# Patient Record
Sex: FEMALE | Race: ASIAN | Employment: FULL TIME | URBAN - METROPOLITAN AREA
[De-identification: names, ages, dates, MRNs, and addresses within clinical notes are randomized per-mention and may not be internally consistent; named-entity substitution may affect disease eponyms.]

---

## 2020-10-12 ENCOUNTER — HOSPITAL ENCOUNTER (OUTPATIENT)
Age: 45
Discharge: HOME | End: 2020-10-12
Payer: COMMERCIAL

## 2020-10-12 VITALS
OXYGEN SATURATION: 99 % | DIASTOLIC BLOOD PRESSURE: 108 MMHG | HEART RATE: 82 BPM | TEMPERATURE: 98 F | RESPIRATION RATE: 16 BRPM | SYSTOLIC BLOOD PRESSURE: 174 MMHG

## 2020-10-12 DIAGNOSIS — G56.03 BILATERAL CARPAL TUNNEL SYNDROME: Primary | ICD-10-CM

## 2020-10-12 PROCEDURE — 99203 OFFICE O/P NEW LOW 30 MIN: CPT | Performed by: PHYSICIAN ASSISTANT

## 2020-10-12 NOTE — ED INITIAL ASSESSMENT (HPI)
Bilateral wrist- numbness and tingling  X 3 months. Per pt its getting worse L>R. Wrist pain at night  X 6 weeks on and off. Denies any injury. Sometimes pain meds helps.

## 2020-10-12 NOTE — ED PROVIDER NOTES
Patient Seen in: [facility] [place]      History   Patient presents with:  Numbness Weakness:  both wrist  Wrist Pain    Stated Complaint: wrist discomfort,numb     HPI    Patient is a 77-year-old female who presents today for evaluatio wrists are normal in appearance. No obvious deformity or edema. Capillary refill is <2sec. Negative Tinel's, negative [place].     Neurological: CN III - XII grossly intact, full strength and sensation in BL extremities        ED Course   Labs Reviewed -

## 2020-10-20 ENCOUNTER — OFFICE VISIT (OUTPATIENT)
Dept: FAMILY MEDICINE CLINIC | Facility: CLINIC | Age: 45
End: 2020-10-20
Payer: COMMERCIAL

## 2020-10-20 VITALS
SYSTOLIC BLOOD PRESSURE: 220 MMHG | HEIGHT: 61.5 IN | WEIGHT: 166 LBS | BODY MASS INDEX: 30.94 KG/M2 | TEMPERATURE: 98 F | OXYGEN SATURATION: 99 % | HEART RATE: 78 BPM | RESPIRATION RATE: 17 BRPM | DIASTOLIC BLOOD PRESSURE: 110 MMHG

## 2020-10-20 DIAGNOSIS — R20.2 PARESTHESIAS: ICD-10-CM

## 2020-10-20 DIAGNOSIS — Z13.0 SCREENING FOR ENDOCRINE, NUTRITIONAL, METABOLIC AND IMMUNITY DISORDER: ICD-10-CM

## 2020-10-20 DIAGNOSIS — Z13.228 SCREENING FOR ENDOCRINE, NUTRITIONAL, METABOLIC AND IMMUNITY DISORDER: ICD-10-CM

## 2020-10-20 DIAGNOSIS — Z13.21 SCREENING FOR ENDOCRINE, NUTRITIONAL, METABOLIC AND IMMUNITY DISORDER: ICD-10-CM

## 2020-10-20 DIAGNOSIS — I10 HYPERTENSION, UNCONTROLLED: Primary | ICD-10-CM

## 2020-10-20 DIAGNOSIS — Z13.29 SCREENING FOR ENDOCRINE, NUTRITIONAL, METABOLIC AND IMMUNITY DISORDER: ICD-10-CM

## 2020-10-20 PROCEDURE — 93000 ELECTROCARDIOGRAM COMPLETE: CPT | Performed by: EMERGENCY MEDICINE

## 2020-10-20 PROCEDURE — 3080F DIAST BP >= 90 MM HG: CPT | Performed by: EMERGENCY MEDICINE

## 2020-10-20 PROCEDURE — 3077F SYST BP >= 140 MM HG: CPT | Performed by: EMERGENCY MEDICINE

## 2020-10-20 PROCEDURE — 3008F BODY MASS INDEX DOCD: CPT | Performed by: EMERGENCY MEDICINE

## 2020-10-20 PROCEDURE — 99204 OFFICE O/P NEW MOD 45 MIN: CPT | Performed by: EMERGENCY MEDICINE

## 2020-10-20 RX ORDER — IBUPROFEN 800 MG/1
TABLET ORAL
COMMUNITY
Start: 2020-10-17

## 2020-10-20 RX ORDER — LISINOPRIL AND HYDROCHLOROTHIAZIDE 25; 20 MG/1; MG/1
1 TABLET ORAL DAILY
Qty: 90 TABLET | Refills: 0 | Status: SHIPPED | OUTPATIENT
Start: 2020-10-20 | End: 2020-11-10

## 2020-10-20 NOTE — PATIENT INSTRUCTIONS
Thank you for choosing [facility]  To Do:  FOR JENY BURCIAGA        1. Start Lisinopril-Hydrochlorothizide, once a day  2. Have blood tests done  3. Follow up in 2 weeks  for recheck and physical and PAP, and mammogram  4. EKG done today  5. relish  · Bottled salad dressings    For snacks and desserts  · Yogurt  · Unsalted, air-popped popcorn  · Unsalted nuts or seeds  Stay away from  · Pies and cakes  · Packaged dessert mixes  · Pizza  · Canned and packaged puddings  · Pretzels, chips, cracke heart-healthy lifestyle:    Choose heart-healthy foods  · Select low-salt, low-fat foods. Limit sodium intake to 2,400 mg per day or the amount suggested by your healthcare provider. · Limit canned, dried, cured, packaged, and fast foods.  These can contai your healthcare provider about quitting smoking. Smoking significantly increases your risk for heart disease and stroke. Ask your healthcare provider about community smoking cessation programs and other options.     Medicines  If lifestyle changes aren’t en tested over a period of time.    Blood pressure is categorized as normal, elevated, or stage 1 or stage 2 high blood pressure:   · Normal blood pressure is systolic of less than 867 and diastolic of less than 80 (120/80)  · Elevated blood pressure is systol about the product's potential interaction with your high blood pressure and your medicines. · Stimulants such as amphetamine or cocaine could be lethal for someone with high blood pressure. Never take these. · Limit how much caffeine you drink.  Consider upper arm at heart level. Make certain the middle of the cuff is directly above the bend of the elbow. Check the monitor's instruction manual for an illustration. · Take multiple readings.  When you measure, take 2 or 3 readings one minute apart and record Corinne 4037. [address] [facility] [place], [address] [place] [address]. All rights reserved. This information is not intended as a substitute for professional medical care. Always follow your healthcare professional's instructions.         Discharge Instructions take medicines that contain heart stimulants, including over-the-counter medicines. Check for warnings about high blood pressure on the label.  Ask the pharmacist before purchasing something you haven't used before  · Check with your doctor or pharmacist be pressure. · Control your stress. Learn ways to manage stress. · Limit alcohol to no more than 1 drink a day for women and 2 drinks a day for men. Follow-up care  Make a follow-up appointment as directed.   When to call your healthcare provider  Call yo

## 2020-10-20 NOTE — PROGRESS NOTES
Chief Complaint:   Patient presents with:  Blood Pressure: NP, high blood pressure   Urgent Care F/u: F/u wrist pain and numbness     HPI:   This is a 40year old female     BLOOD PRESSURE    Elevated BP noted at urgent care.   Home BP readings also flavio Height as of this encounter: 61.5\". Weight as of this encounter: 166 lb (75.3 kg). Vital signs reviewed. Appears stated age, well groomed.   GENERAL: well developed, well nourished, well hydrated, no distress  SKIN: good skin turgor, no obvious rashes mammogram  4. EKG done today  5. Continue with wrist splinting at night  6. Ok to continue with ibuprofen as needed  7. Arrange for nerve conduction studies  8.  Go to emergency room if there is any worsening symptoms including chest pain shortness of breat

## 2020-11-02 ENCOUNTER — PROCEDURE VISIT (OUTPATIENT)
Dept: NEUROLOGY | Facility: CLINIC | Age: 45
End: 2020-11-02
Payer: COMMERCIAL

## 2020-11-02 DIAGNOSIS — G56.03 BILATERAL CARPAL TUNNEL SYNDROME: Primary | ICD-10-CM

## 2020-11-02 PROCEDURE — 95910 NRV CNDJ TEST 7-8 STUDIES: CPT | Performed by: OTHER

## 2020-11-02 PROCEDURE — 95886 MUSC TEST DONE W/N TEST COMP: CPT | Performed by: OTHER

## 2020-11-02 NOTE — PROCEDURES
Date of service: 11/2/2020    Procedure: Nerve Conduction Study and Electromyography - complete EMG study in bilateral upper extremities. History: Electrodiagnostic testing on this 40year old female was performed in bilateral upper extremities.  The pat RAD, BICEPS, TRICEPS, DELTOID reveals normal insertional activity, normal spontaneous activity. Motor unit potentials (MUPs) are of normal amplitude and duration with a full recruitment pattern.  No fibrillations or positive sharp waves (PSWs) or fasciculat

## 2020-11-10 ENCOUNTER — OFFICE VISIT (OUTPATIENT)
Dept: FAMILY MEDICINE CLINIC | Facility: CLINIC | Age: 45
End: 2020-11-10
Payer: COMMERCIAL

## 2020-11-10 VITALS
WEIGHT: 160 LBS | TEMPERATURE: 98 F | HEART RATE: 59 BPM | SYSTOLIC BLOOD PRESSURE: 139 MMHG | BODY MASS INDEX: 29.82 KG/M2 | DIASTOLIC BLOOD PRESSURE: 85 MMHG | RESPIRATION RATE: 15 BRPM | OXYGEN SATURATION: 97 % | HEIGHT: 61.5 IN

## 2020-11-10 DIAGNOSIS — Z23 NEED FOR VACCINATION: ICD-10-CM

## 2020-11-10 DIAGNOSIS — Z12.31 ENCOUNTER FOR SCREENING MAMMOGRAM FOR BREAST CANCER: ICD-10-CM

## 2020-11-10 DIAGNOSIS — Z12.4 SCREENING FOR CERVICAL CANCER: ICD-10-CM

## 2020-11-10 DIAGNOSIS — Z00.00 ENCOUNTER FOR ANNUAL PHYSICAL EXAM: Primary | ICD-10-CM

## 2020-11-10 DIAGNOSIS — I10 HYPERTENSION, UNCONTROLLED: ICD-10-CM

## 2020-11-10 DIAGNOSIS — E66.3 OVERWEIGHT (BMI 25.0-29.9): ICD-10-CM

## 2020-11-10 PROBLEM — G56.03 BILATERAL CARPAL TUNNEL SYNDROME: Status: ACTIVE | Noted: 2020-11-10

## 2020-11-10 PROCEDURE — 3079F DIAST BP 80-89 MM HG: CPT | Performed by: EMERGENCY MEDICINE

## 2020-11-10 PROCEDURE — 3075F SYST BP GE 130 - 139MM HG: CPT | Performed by: EMERGENCY MEDICINE

## 2020-11-10 PROCEDURE — 90686 IIV4 VACC NO PRSV 0.5 ML IM: CPT | Performed by: EMERGENCY MEDICINE

## 2020-11-10 PROCEDURE — 99396 PREV VISIT EST AGE 40-64: CPT | Performed by: EMERGENCY MEDICINE

## 2020-11-10 PROCEDURE — 88175 CYTOPATH C/V AUTO FLUID REDO: CPT | Performed by: EMERGENCY MEDICINE

## 2020-11-10 PROCEDURE — 3008F BODY MASS INDEX DOCD: CPT | Performed by: EMERGENCY MEDICINE

## 2020-11-10 PROCEDURE — 87624 HPV HI-RISK TYP POOLED RSLT: CPT | Performed by: EMERGENCY MEDICINE

## 2020-11-10 PROCEDURE — 90471 IMMUNIZATION ADMIN: CPT | Performed by: EMERGENCY MEDICINE

## 2020-11-10 RX ORDER — LISINOPRIL AND HYDROCHLOROTHIAZIDE 25; 20 MG/1; MG/1
1 TABLET ORAL DAILY
Qty: 90 TABLET | Refills: 0 | Status: SHIPPED | OUTPATIENT
Start: 2020-11-10 | End: 2021-02-10

## 2020-11-10 NOTE — PROGRESS NOTES
Teagan Balbuena is a 39year old female who presents for a complete physical exam.   HPI:     Patient presents with:  Physical: Annual physical and pap   Blood Pressure: F/u         Age: 39    1First day of last menstrual period (or first year of YES       d. If over 27years old, have you  had your cholesterol level checked  in the past five years? NO       e. Have you had a tetanus shot  the past 10 years? YES 2018       f. Does your house have a working smoke detector?  YES        g. Do you have Tobacco Use      Smoking status: Never Smoker      Smokeless tobacco: Never Used    Alcohol use: Yes      Frequency: Monthly or less    Drug use: Never           REVIEW OF SYSTEMS:   GENERAL HEALTH: feels well, no fatigue.   SKIN: denies any unusual skin le D/C. Bimanual exam shows no CMT or adenexal masses or tenderness. Neuro: Cranial nerves II-XII normal,no focal abnormalities, and reflexes coordination and gait normal and symmetric. Sensation intact.   Extremities: are symmetric with no cyanosis, clubbing, every 7-10 years. Call or come in if there are concerns regarding domestic abuse, sexually transmitted diseases, alcohol/drug addiction, depression/anxiety issues, or any further concerns. PATIENT INSTRUCTIONS:      1. Arrange for mammogram  2.  Have bl

## 2020-11-10 NOTE — PATIENT INSTRUCTIONS
Thank you for choosing [facility]  To Do:  FOR JENY BURCIAGA        1. Arrange for mammogram  2. Have blood tests done  3. Follow up in 3 months  4. Continue with weight loss  5. Low salt diet  6. MOnitor BP  7.  Continue with Lisinopril/Hydro muffin, whole wheat   175 mg/1 muffin   Cheddar cheese   205 mg/1 oz.   Navy beans, cooked   79 mg/1/2 cup   Kale   90 mg/1/2 cup   Ice cream strawberry   79 mg/1/2 cup           Orange, navel   56 mg/1 medium   Note: Calcium levels may vary depending on b who have had a complete hysterectomy Pap test every 3 years or Pap test plus human papilloma virus (HPV) test every 5 years   Chlamydia Women at increased risk for infection At routine exams if you're at risk or have symptoms   Depression All women in this women in this age group who have no record of these infections or vaccines 1 or 2 doses   Meningococcal Women at increased risk for infection – talk with your healthcare provider 1 or more doses   Pneumococcal conjugate vaccine (PCV13) and pneumococcal hayde

## 2020-11-17 ENCOUNTER — HOSPITAL ENCOUNTER (OUTPATIENT)
Dept: MAMMOGRAPHY | Age: 45
Discharge: HOME | End: 2020-11-17
Attending: EMERGENCY MEDICINE
Payer: COMMERCIAL

## 2020-11-17 ENCOUNTER — LABORATORY ENCOUNTER (OUTPATIENT)
Dept: LAB | Age: 45
End: 2020-11-17
Attending: EMERGENCY MEDICINE
Payer: COMMERCIAL

## 2020-11-17 DIAGNOSIS — Z12.31 ENCOUNTER FOR SCREENING MAMMOGRAM FOR BREAST CANCER: ICD-10-CM

## 2020-11-17 DIAGNOSIS — I10 HYPERTENSION, UNCONTROLLED: ICD-10-CM

## 2020-11-17 DIAGNOSIS — Z13.0 SCREENING FOR ENDOCRINE, NUTRITIONAL, METABOLIC AND IMMUNITY DISORDER: ICD-10-CM

## 2020-11-17 DIAGNOSIS — Z13.228 SCREENING FOR ENDOCRINE, NUTRITIONAL, METABOLIC AND IMMUNITY DISORDER: ICD-10-CM

## 2020-11-17 DIAGNOSIS — Z13.21 SCREENING FOR ENDOCRINE, NUTRITIONAL, METABOLIC AND IMMUNITY DISORDER: ICD-10-CM

## 2020-11-17 DIAGNOSIS — Z13.29 SCREENING FOR ENDOCRINE, NUTRITIONAL, METABOLIC AND IMMUNITY DISORDER: ICD-10-CM

## 2020-11-17 DIAGNOSIS — E66.3 OVERWEIGHT (BMI 25.0-29.9): ICD-10-CM

## 2020-11-17 PROCEDURE — 36415 COLL VENOUS BLD VENIPUNCTURE: CPT

## 2020-11-17 PROCEDURE — 83036 HEMOGLOBIN GLYCOSYLATED A1C: CPT

## 2020-11-17 PROCEDURE — 80061 LIPID PANEL: CPT

## 2020-11-17 PROCEDURE — 81001 URINALYSIS AUTO W/SCOPE: CPT

## 2020-11-17 PROCEDURE — 80053 COMPREHEN METABOLIC PANEL: CPT

## 2020-11-17 PROCEDURE — 84443 ASSAY THYROID STIM HORMONE: CPT

## 2020-11-17 PROCEDURE — 77067 SCR MAMMO BI INCL CAD: CPT | Performed by: EMERGENCY MEDICINE

## 2020-11-17 PROCEDURE — 85025 COMPLETE CBC W/AUTO DIFF WBC: CPT

## 2020-11-19 ENCOUNTER — TELEPHONE (OUTPATIENT)
Dept: FAMILY MEDICINE CLINIC | Facility: CLINIC | Age: 45
End: 2020-11-19

## 2020-11-19 NOTE — TELEPHONE ENCOUNTER
----- Message from Иван Morocho MD sent at 11/19/2020  2:04 PM CST -----  HBA1C in prediabetic range. Pls stress importance of weight loss in prevention of progression to DM. Pls  on losing weight.  Low carb diet    Rest of labs stable

## 2020-11-19 NOTE — TELEPHONE ENCOUNTER
----- Message from Jose C Tran MD sent at 11/19/2020  1:56 PM CST -----  NEG PAP and  Neg HPV  Repeat in 3 years verbal instruction

## 2021-02-09 DIAGNOSIS — I10 HYPERTENSION, UNCONTROLLED: ICD-10-CM

## 2021-02-10 RX ORDER — LISINOPRIL AND HYDROCHLOROTHIAZIDE 25; 20 MG/1; MG/1
1 TABLET ORAL DAILY
Qty: 90 TABLET | Refills: 0 | Status: SHIPPED | OUTPATIENT
Start: 2021-02-10 | End: 2021-05-20

## 2021-02-10 NOTE — TELEPHONE ENCOUNTER
Medication(s) to Refill:   Requested Prescriptions     Pending Prescriptions Disp Refills   • LISINOPRIL-HYDROCHLOROTHIAZIDE 20-25 MG Oral Tab [Pharmacy Med Name: LISINOPRIL-HCTZ 20/25MG TABLETS] 90 tablet 0     Sig: TAKE 1 TABLET BY MOUTH DAILY

## 2021-02-16 ENCOUNTER — OFFICE VISIT (OUTPATIENT)
Dept: FAMILY MEDICINE CLINIC | Facility: CLINIC | Age: 46
End: 2021-02-16
Payer: COMMERCIAL

## 2021-02-16 VITALS
SYSTOLIC BLOOD PRESSURE: 138 MMHG | DIASTOLIC BLOOD PRESSURE: 84 MMHG | OXYGEN SATURATION: 100 % | HEART RATE: 104 BPM | BODY MASS INDEX: 32 KG/M2 | WEIGHT: 171 LBS | TEMPERATURE: 98 F | RESPIRATION RATE: 17 BRPM

## 2021-02-16 DIAGNOSIS — I10 HYPERTENSION, UNCONTROLLED: Primary | ICD-10-CM

## 2021-02-16 DIAGNOSIS — R06.83 SNORING: ICD-10-CM

## 2021-02-16 PROCEDURE — 3079F DIAST BP 80-89 MM HG: CPT | Performed by: EMERGENCY MEDICINE

## 2021-02-16 PROCEDURE — 99213 OFFICE O/P EST LOW 20 MIN: CPT | Performed by: EMERGENCY MEDICINE

## 2021-02-16 PROCEDURE — 3075F SYST BP GE 130 - 139MM HG: CPT | Performed by: EMERGENCY MEDICINE

## 2021-02-16 NOTE — PROGRESS NOTES
Chief Complaint:   Patient presents with:  Blood Pressure: F/u    HPI:   This is a 39year old female       HYPERTENSION  On lisinopril-hydrochlorothiazide,   Compliant with meds  No cough with medications  Eating better.        SNORING  Admits to loud sn obvious rashes  HEENT: atraumatic, normocephalic, ears, nose and throat are clear, Mallampati class III  EYES: sclera non icteric bilateral  NECK: supple, no adenopathy, no thyromegaly  LUNGS: clear to auscultation, no RRW  CARDIO: RRR without murmur  EXTR

## 2021-02-16 NOTE — PATIENT INSTRUCTIONS
Thank you for choosing [facility]  To Do:  FOR JENY BURCIAGA        1. Follow up in 6 months   2. continue home BP monitoring 2-3 x a week  3. Continue with lisinopril-hydrochlorothiazide  4.  Monitor sleeping and snoring habits           Low- [address]. All rights reserved. This information is not intended as a substitute for professional medical care. Always follow your healthcare professional's instructions.         Discharge Instructions for High Blood Pressure (Hypertension)  You have bee including over-the-counter medicines. Check for warnings about high blood pressure on the label.  Ask the pharmacist before purchasing something you haven't used before  · Check with your doctor or pharmacist before taking a decongestant such as pseudoephed stress. · Limit alcohol to no more than 1 drink a day for women and 2 drinks a day for men. Follow-up care  Make a follow-up appointment as directed.   When to call your healthcare provider  Call your healthcare provider right away if you have any of th scar tissue as it heals. This makes the arteries stiff and weak. Plaque sticks to the scarred tissue narrowing and hardening the arteries. High blood pressure also causes your heart to work harder to get blood out to the body.  High blood pressure raises yo blood pressure goals should be.   Controlling blood pressure  If your blood pressure is too high, work with your doctor on a plan for lowering it. Below are steps you can take that will help lower your blood pressure. · Choose heart-healthy foods.  Eating The only way to know for sure is to check your pressure regularly. · Medicine is only one part of controlling high blood pressure. You also need to manage your weight, get regular exercise, and adjust your eating habits.   · High blood pressure is usually risk. Check the ones that apply to you. ? What you eat  Do you eat a lot of salty, fatty, fried, or greasy foods? Do you go to restaurants or eat out frequently? ?  What you drink  Do you have more than 1 alcoholic drink a day if you are a female or more

## 2021-05-19 DIAGNOSIS — I10 HYPERTENSION, UNCONTROLLED: ICD-10-CM

## 2021-05-19 NOTE — TELEPHONE ENCOUNTER
Medication(s) to Refill:   Requested Prescriptions     Pending Prescriptions Disp Refills   • LISINOPRIL-HYDROCHLOROTHIAZIDE 20-25 MG Oral Tab [Pharmacy Med Name: LISINOPRIL-HCTZ 20/25MG TABLETS] 90 tablet 0     Sig: TAKE 1 TABLET BY MOUTH DAILY         Re

## 2021-05-20 RX ORDER — LISINOPRIL AND HYDROCHLOROTHIAZIDE 25; 20 MG/1; MG/1
1 TABLET ORAL DAILY
Qty: 90 TABLET | Refills: 0 | Status: SHIPPED | OUTPATIENT
Start: 2021-05-20 | End: 2021-08-31

## 2021-08-29 DIAGNOSIS — I10 HYPERTENSION, UNCONTROLLED: ICD-10-CM

## 2021-08-31 RX ORDER — LISINOPRIL AND HYDROCHLOROTHIAZIDE 25; 20 MG/1; MG/1
1 TABLET ORAL DAILY
Qty: 90 TABLET | Refills: 0 | Status: SHIPPED | OUTPATIENT
Start: 2021-08-31 | End: 2021-09-24

## 2021-08-31 NOTE — TELEPHONE ENCOUNTER
Lisinopril-hydrochlorothiazide refilled x 90 d  Needs office visit before next refill,due for 6 month recheck

## 2021-09-24 ENCOUNTER — OFFICE VISIT (OUTPATIENT)
Dept: FAMILY MEDICINE CLINIC | Facility: CLINIC | Age: 46
End: 2021-09-24
Payer: COMMERCIAL

## 2021-09-24 VITALS
BODY MASS INDEX: 33.18 KG/M2 | HEART RATE: 81 BPM | RESPIRATION RATE: 14 BRPM | HEIGHT: 61.5 IN | WEIGHT: 178 LBS | OXYGEN SATURATION: 98 % | SYSTOLIC BLOOD PRESSURE: 118 MMHG | DIASTOLIC BLOOD PRESSURE: 72 MMHG

## 2021-09-24 DIAGNOSIS — R29.818 SUSPECTED SLEEP APNEA: ICD-10-CM

## 2021-09-24 DIAGNOSIS — I10 HYPERTENSION, UNSPECIFIED TYPE: Primary | ICD-10-CM

## 2021-09-24 DIAGNOSIS — R06.83 SNORING: ICD-10-CM

## 2021-09-24 DIAGNOSIS — G56.03 BILATERAL CARPAL TUNNEL SYNDROME: ICD-10-CM

## 2021-09-24 PROCEDURE — 3008F BODY MASS INDEX DOCD: CPT | Performed by: EMERGENCY MEDICINE

## 2021-09-24 PROCEDURE — 3078F DIAST BP <80 MM HG: CPT | Performed by: EMERGENCY MEDICINE

## 2021-09-24 PROCEDURE — 99214 OFFICE O/P EST MOD 30 MIN: CPT | Performed by: EMERGENCY MEDICINE

## 2021-09-24 PROCEDURE — 3074F SYST BP LT 130 MM HG: CPT | Performed by: EMERGENCY MEDICINE

## 2021-09-24 RX ORDER — LISINOPRIL AND HYDROCHLOROTHIAZIDE 25; 20 MG/1; MG/1
1 TABLET ORAL DAILY
Qty: 90 TABLET | Refills: 1 | Status: SHIPPED | OUTPATIENT
Start: 2021-09-24

## 2021-09-24 NOTE — PROGRESS NOTES
Chief Complaint:   Patient presents with:  Blood Pressure: F/u     HPI:   This is a 39year old female       HYPERTENSION    Compliant with meds  No CP no SOB  Reports some low BP 97 systolic 1 month ago  \"irreg heart beat\" sometimes detected by BP mac this encounter: 5' 1.5\" (1.562 m). Weight as of this encounter: 178 lb (80.7 kg). Vital signs reviewed. Appears stated age, well groomed.   GENERAL: well developed, well nourished, well hydrated, no distress  SKIN: good skin turgor, no obvious rashes

## 2021-09-24 NOTE — PATIENT INSTRUCTIONS
Thank you for choosing [facility]  To Do:  FOR JENY BURCIAGA        1. Follow up with hand ortho, Dr. Prince Dubin.  2. Arrange for sleep study  3. Continue with Lisinopril hydrochlorothiazide  4. Follow up in Nov/Dec for annual physical  5.  Dell Reinoso tunnel syndrome  Certain treatments help reduce the pressure on the median nerve and relieve symptoms. Choices for treatment may include one or more of the following:  · Wrist splint. This involves wearing a special brace on the wrist and hand.  The splint worse at night and may wake you when you are asleep. Carpal tunnel syndrome may occur during pregnancy and with use of birth control pills. It is more common in workers who must often bend their wrists.  It is also common in people who work with power tool advice  Call your healthcare provider right away if any of these occur:  · Pain not improving with the above treatment  · Fingers or hand become cold, blue, numb, or tingly  · Your whole arm becomes swollen or weak  Haley last reviewed this educational cakes  · Packaged dessert mixes  · Pizza  · Canned and packaged puddings  · Pretzels, chips, crackers, and nuts—unless the label says unsalted    Haley last reviewed this educational content on 11/1/2019 © 2000-2021 [employer] [address].  All right

## 2021-10-06 ENCOUNTER — PATIENT MESSAGE (OUTPATIENT)
Dept: FAMILY MEDICINE CLINIC | Facility: CLINIC | Age: 46
End: 2021-10-06

## 2021-10-07 NOTE — TELEPHONE ENCOUNTER
From: Shayne Xiong  To: Conrad Montanez MD  Sent: 10/6/2021 11:33 PM CDT  Subject: Accommodation Request    Hi Doctor Sangita Ring,    I would like to request from your office to please provide me Letter of Accommodation request addressed to the compan

## 2021-10-07 NOTE — TELEPHONE ENCOUNTER
Pt requesting a letter for her employer in regards to her Carpal tunnel. They have agreed to switch her to another dept that has less lifting. OK to provide letter in regards to Carpal tunnel?  LOV 9/24/21

## 2021-10-07 NOTE — TELEPHONE ENCOUNTER
Imp- osteoarthritis; refilled oxycontin ER 30 mg po BID, #60 and Norco 10 mg two tabs po TID, #180; ICD 10 code for pt G89.4;      ERx sent Ok to write letter for work restrictions, with no strong  or pinches an to keep wrists neutral as much as possible  Pls allow pt to wear wrist splint as needed  Management and Further restrictions should be from Ortho  Pls make sure she follows up wit

## 2021-10-08 ENCOUNTER — PATIENT MESSAGE (OUTPATIENT)
Dept: FAMILY MEDICINE CLINIC | Facility: CLINIC | Age: 46
End: 2021-10-08

## 2021-10-08 NOTE — TELEPHONE ENCOUNTER
Discussed w/ pt. Letter sent to her My Chart.  I told her that further accommodations would need to come from specialist.

## 2021-10-18 ENCOUNTER — TELEPHONE (OUTPATIENT)
Dept: ORTHOPEDICS CLINIC | Facility: CLINIC | Age: 46
End: 2021-10-18

## 2021-10-18 NOTE — TELEPHONE ENCOUNTER
Pt has an appt for bilateral carpal tunnel w? DR Seth Rivera. No imaging on file. Please advice, thanks.   Future Appointments   Date Time Provider Tonya Carpenter   11/8/2021 11:00 AM Shoaib Sidhu MD [facility]   11/16/2021  4:30 PM Rickey Sheridan

## 2021-11-16 ENCOUNTER — OFFICE VISIT (OUTPATIENT)
Dept: ORTHOPEDICS CLINIC | Facility: CLINIC | Age: 46
End: 2021-11-16
Payer: COMMERCIAL

## 2021-11-16 VITALS — OXYGEN SATURATION: 99 % | HEART RATE: 74 BPM

## 2021-11-16 DIAGNOSIS — G56.03 BILATERAL CARPAL TUNNEL SYNDROME: Primary | ICD-10-CM

## 2021-11-16 PROCEDURE — 99203 OFFICE O/P NEW LOW 30 MIN: CPT | Performed by: ORTHOPAEDIC SURGERY

## 2021-11-16 RX ORDER — PREGABALIN 75 MG/1
75 CAPSULE ORAL 2 TIMES DAILY
Qty: 60 CAPSULE | Refills: 0 | Status: SHIPPED | OUTPATIENT
Start: 2021-11-16 | End: 2021-12-16

## 2022-01-03 NOTE — H&P
Clinic Note [facility]     Assessment/Plan:  55year old female    1. Bilateral CTS- discussed that surgical decompression would be recommended based on clinical symptoms and EMG/NCV. Patient will determine optimal timing for surgery.  In mean time p used    Substance and Sexual Activity      Alcohol use: Yes      Drug use: Never      Sexual activity: Not on file       Review of Systems (negative unless bolded):  General: fevers, chills, fatigue  CV:  chest pain, palpitations, leg swelling  Msk: bodyac

## 2022-01-05 ENCOUNTER — OFFICE VISIT (OUTPATIENT)
Dept: FAMILY MEDICINE CLINIC | Facility: CLINIC | Age: 47
End: 2022-01-05
Payer: COMMERCIAL

## 2022-01-05 VITALS
RESPIRATION RATE: 16 BRPM | HEIGHT: 61.5 IN | SYSTOLIC BLOOD PRESSURE: 146 MMHG | DIASTOLIC BLOOD PRESSURE: 88 MMHG | BODY MASS INDEX: 34.67 KG/M2 | WEIGHT: 186 LBS | OXYGEN SATURATION: 98 % | HEART RATE: 88 BPM

## 2022-01-05 DIAGNOSIS — Z71.84 ENCOUNTER FOR COUNSELING FOR TRAVEL: Primary | ICD-10-CM

## 2022-01-05 PROCEDURE — 3079F DIAST BP 80-89 MM HG: CPT | Performed by: FAMILY MEDICINE

## 2022-01-05 PROCEDURE — 3008F BODY MASS INDEX DOCD: CPT | Performed by: FAMILY MEDICINE

## 2022-01-05 PROCEDURE — 99212 OFFICE O/P EST SF 10 MIN: CPT | Performed by: FAMILY MEDICINE

## 2022-01-05 PROCEDURE — 3077F SYST BP >= 140 MM HG: CPT | Performed by: FAMILY MEDICINE

## 2022-01-05 NOTE — PROGRESS NOTES
[facility] Office Note  Chief Complaint:   Patient presents with:  Paperwork: FMLA to travel to  the [facility] with spouse after open heart surgery 10/18/2021.  FMLA to be dated 1/31/2022 - 3/6/2022      HPI:   This is a 55 year Mood and Affect: Mood normal.          ASSESSMENT AND PLAN:   1. Encounter for counseling for travel  Completed FMLA paperwork, reviewed travel precautions. Immunizations UTD. Follow up PRN.        Meds & Refills for this Visit:  Requested Prescriptio

## 2022-04-04 RX ORDER — LISINOPRIL AND HYDROCHLOROTHIAZIDE 25; 20 MG/1; MG/1
1 TABLET ORAL DAILY
Qty: 90 TABLET | Refills: 0 | Status: SHIPPED | OUTPATIENT
Start: 2022-04-04

## 2022-04-25 ENCOUNTER — OFFICE VISIT (OUTPATIENT)
Dept: FAMILY MEDICINE CLINIC | Facility: CLINIC | Age: 47
End: 2022-04-25
Payer: COMMERCIAL

## 2022-04-25 VITALS
BODY MASS INDEX: 34.67 KG/M2 | HEIGHT: 61.5 IN | OXYGEN SATURATION: 97 % | SYSTOLIC BLOOD PRESSURE: 162 MMHG | HEART RATE: 69 BPM | WEIGHT: 186 LBS | RESPIRATION RATE: 17 BRPM | DIASTOLIC BLOOD PRESSURE: 100 MMHG

## 2022-04-25 DIAGNOSIS — Z00.00 ENCOUNTER FOR ANNUAL PHYSICAL EXAMINATION EXCLUDING GYNECOLOGICAL EXAMINATION IN A PATIENT OLDER THAN 17 YEARS: Primary | ICD-10-CM

## 2022-04-25 DIAGNOSIS — Z12.11 SCREENING FOR COLON CANCER: ICD-10-CM

## 2022-04-25 DIAGNOSIS — Z00.00 LABORATORY EXAMINATION ORDERED AS PART OF A ROUTINE GENERAL MEDICAL EXAMINATION: ICD-10-CM

## 2022-04-25 DIAGNOSIS — I10 HYPERTENSION, UNSPECIFIED TYPE: ICD-10-CM

## 2022-04-25 DIAGNOSIS — E66.9 OBESITY (BMI 30-39.9): ICD-10-CM

## 2022-04-25 DIAGNOSIS — Z12.31 ENCOUNTER FOR SCREENING MAMMOGRAM FOR MALIGNANT NEOPLASM OF BREAST: ICD-10-CM

## 2022-04-25 PROCEDURE — 99396 PREV VISIT EST AGE 40-64: CPT | Performed by: EMERGENCY MEDICINE

## 2022-04-25 PROCEDURE — 3008F BODY MASS INDEX DOCD: CPT | Performed by: EMERGENCY MEDICINE

## 2022-04-25 PROCEDURE — 3077F SYST BP >= 140 MM HG: CPT | Performed by: EMERGENCY MEDICINE

## 2022-04-25 PROCEDURE — 99212 OFFICE O/P EST SF 10 MIN: CPT | Performed by: EMERGENCY MEDICINE

## 2022-04-25 PROCEDURE — 3080F DIAST BP >= 90 MM HG: CPT | Performed by: EMERGENCY MEDICINE

## 2022-04-25 RX ORDER — AMLODIPINE BESYLATE 5 MG/1
5 TABLET ORAL DAILY
Qty: 90 TABLET | Refills: 0 | Status: SHIPPED | OUTPATIENT
Start: 2022-04-25 | End: 2023-04-20

## 2022-04-25 RX ORDER — LISINOPRIL AND HYDROCHLOROTHIAZIDE 25; 20 MG/1; MG/1
1 TABLET ORAL DAILY
Qty: 90 TABLET | Refills: 0 | Status: SHIPPED | OUTPATIENT
Start: 2022-04-25

## 2022-05-18 ENCOUNTER — LAB ENCOUNTER (OUTPATIENT)
Dept: LAB | Age: 47
End: 2022-05-18
Attending: EMERGENCY MEDICINE
Payer: COMMERCIAL

## 2022-05-18 ENCOUNTER — HOSPITAL ENCOUNTER (OUTPATIENT)
Dept: MAMMOGRAPHY | Age: 47
Discharge: HOME | End: 2022-05-18
Attending: EMERGENCY MEDICINE
Payer: COMMERCIAL

## 2022-05-18 DIAGNOSIS — Z12.31 ENCOUNTER FOR SCREENING MAMMOGRAM FOR MALIGNANT NEOPLASM OF BREAST: ICD-10-CM

## 2022-05-18 DIAGNOSIS — Z00.00 LABORATORY EXAMINATION ORDERED AS PART OF A ROUTINE GENERAL MEDICAL EXAMINATION: ICD-10-CM

## 2022-05-18 DIAGNOSIS — R73.01 IMPAIRED FASTING GLUCOSE: ICD-10-CM

## 2022-05-18 DIAGNOSIS — R73.01 IMPAIRED FASTING GLUCOSE: Primary | ICD-10-CM

## 2022-05-18 LAB
ALBUMIN SERPL-MCNC: 4 G/DL (ref 3.4–5)
ALBUMIN/GLOB SERPL: 0.9 {RATIO} (ref 1–2)
ALP LIVER SERPL-CCNC: 47 U/L
ALT SERPL-CCNC: 23 U/L
ANION GAP SERPL CALC-SCNC: 7 MMOL/L (ref 0–18)
AST SERPL-CCNC: 20 U/L (ref 15–37)
BASOPHILS # BLD AUTO: 0.13 X10(3) UL (ref 0–0.2)
BASOPHILS NFR BLD AUTO: 1.5 %
BILIRUB SERPL-MCNC: 0.5 MG/DL (ref 0.1–2)
BUN BLD-MCNC: 17 MG/DL (ref 7–18)
CALCIUM BLD-MCNC: 9.7 MG/DL (ref 8.5–10.1)
CHLORIDE SERPL-SCNC: 101 MMOL/L (ref 98–112)
CHOLEST SERPL-MCNC: 154 MG/DL (ref ?–200)
CO2 SERPL-SCNC: 28 MMOL/L (ref 21–32)
CREAT BLD-MCNC: 0.99 MG/DL
EOSINOPHIL # BLD AUTO: 0.23 X10(3) UL (ref 0–0.7)
EOSINOPHIL NFR BLD AUTO: 2.7 %
ERYTHROCYTE [DISTWIDTH] IN BLOOD BY AUTOMATED COUNT: 12.2 %
EST. AVERAGE GLUCOSE BLD GHB EST-MCNC: 137 MG/DL (ref 68–126)
FASTING PATIENT LIPID ANSWER: YES
FASTING STATUS PATIENT QL REPORTED: YES
GLOBULIN PLAS-MCNC: 4.3 G/DL (ref 2.8–4.4)
GLUCOSE BLD-MCNC: 136 MG/DL (ref 70–99)
HBA1C MFR BLD: 6.4 % (ref ?–5.7)
HCT VFR BLD AUTO: 44.5 %
HDLC SERPL-MCNC: 54 MG/DL (ref 40–59)
HGB BLD-MCNC: 14.5 G/DL
IMM GRANULOCYTES # BLD AUTO: 0.03 X10(3) UL (ref 0–1)
IMM GRANULOCYTES NFR BLD: 0.4 %
LDLC SERPL CALC-MCNC: 89 MG/DL (ref ?–100)
LYMPHOCYTES # BLD AUTO: 2.32 X10(3) UL (ref 1–4)
LYMPHOCYTES NFR BLD AUTO: 27.1 %
MCH RBC QN AUTO: 28 PG (ref 26–34)
MCHC RBC AUTO-ENTMCNC: 32.6 G/DL (ref 31–37)
MCV RBC AUTO: 86.1 FL
MONOCYTES # BLD AUTO: 0.64 X10(3) UL (ref 0.1–1)
MONOCYTES NFR BLD AUTO: 7.5 %
NEUTROPHILS # BLD AUTO: 5.21 X10 (3) UL (ref 1.5–7.7)
NEUTROPHILS # BLD AUTO: 5.21 X10(3) UL (ref 1.5–7.7)
NEUTROPHILS NFR BLD AUTO: 60.8 %
NONHDLC SERPL-MCNC: 100 MG/DL (ref ?–130)
OSMOLALITY SERPL CALC.SUM OF ELEC: 286 MOSM/KG (ref 275–295)
PLATELET # BLD AUTO: 353 10(3)UL (ref 150–450)
POTASSIUM SERPL-SCNC: 4.4 MMOL/L (ref 3.5–5.1)
PROT SERPL-MCNC: 8.3 G/DL (ref 6.4–8.2)
RBC # BLD AUTO: 5.17 X10(6)UL
SODIUM SERPL-SCNC: 136 MMOL/L (ref 136–145)
TRIGL SERPL-MCNC: 51 MG/DL (ref 30–149)
TSI SER-ACNC: 2.17 MIU/ML (ref 0.36–3.74)
VLDLC SERPL CALC-MCNC: 8 MG/DL (ref 0–30)
WBC # BLD AUTO: 8.6 X10(3) UL (ref 4–11)

## 2022-05-18 PROCEDURE — 84443 ASSAY THYROID STIM HORMONE: CPT

## 2022-05-18 PROCEDURE — 36415 COLL VENOUS BLD VENIPUNCTURE: CPT

## 2022-05-18 PROCEDURE — 83036 HEMOGLOBIN GLYCOSYLATED A1C: CPT

## 2022-05-18 PROCEDURE — 77067 SCR MAMMO BI INCL CAD: CPT | Performed by: EMERGENCY MEDICINE

## 2022-05-18 PROCEDURE — 85025 COMPLETE CBC W/AUTO DIFF WBC: CPT

## 2022-05-18 PROCEDURE — 80061 LIPID PANEL: CPT

## 2022-05-18 PROCEDURE — 77063 BREAST TOMOSYNTHESIS BI: CPT | Performed by: EMERGENCY MEDICINE

## 2022-05-18 PROCEDURE — 80053 COMPREHEN METABOLIC PANEL: CPT

## 2022-05-31 ENCOUNTER — OFFICE VISIT (OUTPATIENT)
Dept: FAMILY MEDICINE CLINIC | Facility: CLINIC | Age: 47
End: 2022-05-31
Payer: COMMERCIAL

## 2022-05-31 VITALS
RESPIRATION RATE: 17 BRPM | SYSTOLIC BLOOD PRESSURE: 122 MMHG | OXYGEN SATURATION: 98 % | BODY MASS INDEX: 33 KG/M2 | HEART RATE: 103 BPM | WEIGHT: 179 LBS | DIASTOLIC BLOOD PRESSURE: 74 MMHG

## 2022-05-31 DIAGNOSIS — I10 HYPERTENSION, UNSPECIFIED TYPE: Primary | ICD-10-CM

## 2022-05-31 DIAGNOSIS — E66.9 OBESITY (BMI 30-39.9): ICD-10-CM

## 2022-05-31 DIAGNOSIS — R73.03 PREDIABETES: ICD-10-CM

## 2022-05-31 PROCEDURE — 3078F DIAST BP <80 MM HG: CPT | Performed by: EMERGENCY MEDICINE

## 2022-05-31 PROCEDURE — 3074F SYST BP LT 130 MM HG: CPT | Performed by: EMERGENCY MEDICINE

## 2022-05-31 PROCEDURE — 99214 OFFICE O/P EST MOD 30 MIN: CPT | Performed by: EMERGENCY MEDICINE

## 2022-07-17 DIAGNOSIS — I10 HYPERTENSION, UNSPECIFIED TYPE: ICD-10-CM

## 2022-07-18 RX ORDER — AMLODIPINE BESYLATE 5 MG/1
TABLET ORAL
Qty: 90 TABLET | Refills: 0 | OUTPATIENT
Start: 2022-07-18

## 2022-09-18 DIAGNOSIS — I10 HYPERTENSION, UNSPECIFIED TYPE: ICD-10-CM

## 2022-09-21 RX ORDER — LISINOPRIL AND HYDROCHLOROTHIAZIDE 25; 20 MG/1; MG/1
1 TABLET ORAL DAILY
Qty: 90 TABLET | Refills: 1 | Status: SHIPPED | OUTPATIENT
Start: 2022-09-21

## 2022-11-13 ENCOUNTER — HOSPITAL ENCOUNTER (OUTPATIENT)
Age: 47
Discharge: HOME | End: 2022-11-13
Attending: EMERGENCY MEDICINE
Payer: COMMERCIAL

## 2022-11-13 VITALS
TEMPERATURE: 98 F | OXYGEN SATURATION: 98 % | WEIGHT: 170 LBS | HEIGHT: 62 IN | SYSTOLIC BLOOD PRESSURE: 169 MMHG | HEART RATE: 83 BPM | RESPIRATION RATE: 20 BRPM | DIASTOLIC BLOOD PRESSURE: 90 MMHG | BODY MASS INDEX: 31.28 KG/M2

## 2022-11-13 DIAGNOSIS — J02.9 VIRAL PHARYNGITIS: Primary | ICD-10-CM

## 2022-11-13 LAB
POCT INFLUENZA A: NEGATIVE
POCT INFLUENZA B: NEGATIVE
S PYO AG THROAT QL: NEGATIVE
SARS-COV-2 RNA RESP QL NAA+PROBE: NOT DETECTED

## 2022-11-13 PROCEDURE — 87880 STREP A ASSAY W/OPTIC: CPT

## 2022-11-13 PROCEDURE — 99213 OFFICE O/P EST LOW 20 MIN: CPT

## 2022-11-13 PROCEDURE — 99212 OFFICE O/P EST SF 10 MIN: CPT

## 2022-11-13 PROCEDURE — 87502 INFLUENZA DNA AMP PROBE: CPT | Performed by: EMERGENCY MEDICINE

## 2022-11-13 NOTE — DISCHARGE INSTRUCTIONS
Tylenol, motrin and mucinex for symptoms at home  Encourage plenty of fluids and rest at home  Return to the ER if symptoms worsen or if any other problems arise

## 2023-02-08 ENCOUNTER — PATIENT MESSAGE (OUTPATIENT)
Dept: FAMILY MEDICINE CLINIC | Facility: CLINIC | Age: 48
End: 2023-02-08

## 2023-02-08 DIAGNOSIS — Z11.1 SCREENING FOR TUBERCULOSIS: Primary | ICD-10-CM

## 2023-02-08 DIAGNOSIS — Z01.84 IMMUNITY STATUS TESTING: ICD-10-CM

## 2023-02-09 NOTE — TELEPHONE ENCOUNTER
Ok to order quantiferon   Or patient can come in for TB test (requires return w/in 48-72 hours)    She wants to wait for MMR titer? What about Hep B titer? She is declining HEP B vaccine?

## 2023-02-09 NOTE — TELEPHONE ENCOUNTER
From: Arpan Villasenor  Sent: 2/8/2023 6:48 PM CST  To: [facility] Clinical Staff  Subject: Verification of all Lab Reports    1. Hepatitis B Vaccine Declination signed and approved. 2. Influenza Vaccination Record - done and approved  3. COVID 19 plus 1 Booster - done and approved  4. TB Test/ Quantiferon Gold Test - Needs doctors order to be completed    Immunization needed:   1. TDAP - done and approved  2. Varicella - 1st dose in series. 2nd dose 02/23/23  3.  MMR - Appointment after 2nd dose of Varicella 02/24/23

## 2023-02-09 NOTE — TELEPHONE ENCOUNTER
Pt sent message stating she is currently in school and need immunizations/TB order form to be completed. Please advise. Thank you.    LOV: 05/31/22

## 2023-02-10 ENCOUNTER — LAB ENCOUNTER (OUTPATIENT)
Dept: LAB | Age: 48
End: 2023-02-10
Attending: EMERGENCY MEDICINE
Payer: COMMERCIAL

## 2023-02-10 DIAGNOSIS — Z01.84 IMMUNITY STATUS TESTING: ICD-10-CM

## 2023-02-10 DIAGNOSIS — Z11.1 SCREENING FOR TUBERCULOSIS: ICD-10-CM

## 2023-02-10 PROCEDURE — 86480 TB TEST CELL IMMUN MEASURE: CPT

## 2023-02-10 PROCEDURE — 86765 RUBEOLA ANTIBODY: CPT

## 2023-02-10 PROCEDURE — 86706 HEP B SURFACE ANTIBODY: CPT

## 2023-02-10 PROCEDURE — 36415 COLL VENOUS BLD VENIPUNCTURE: CPT

## 2023-02-10 PROCEDURE — 86762 RUBELLA ANTIBODY: CPT

## 2023-02-10 PROCEDURE — 86735 MUMPS ANTIBODY: CPT

## 2023-02-11 LAB
HBV SURFACE AB SER QL: NONREACTIVE
HBV SURFACE AB SERPL IA-ACNC: <3.1 MIU/ML
RUBV IGG SER QL: POSITIVE
RUBV IGG SER-ACNC: 350.2 IU/ML (ref 10–?)

## 2023-02-13 ENCOUNTER — TELEPHONE (OUTPATIENT)
Dept: FAMILY MEDICINE CLINIC | Facility: CLINIC | Age: 48
End: 2023-02-13

## 2023-02-13 DIAGNOSIS — R76.12 POSITIVE QUANTIFERON-TB GOLD TEST: Primary | ICD-10-CM

## 2023-02-13 LAB
M TB IFN-G CD4+ T-CELLS BLD-ACNC: 0.18 IU/ML
M TB TUBERC IFN-G BLD QL: POSITIVE
M TB TUBERC IGNF/MITOGEN IGNF CONTROL: >10 IU/ML
MEV IGG SER-ACNC: 156 AU/ML (ref 16.5–?)
MUV IGG SER IA-ACNC: 260 AU/ML (ref 11–?)
QFT TB1 AG MINUS NIL: 1.99 IU/ML
QFT TB2 AG MINUS NIL: 2.12 IU/ML

## 2023-02-13 NOTE — TELEPHONE ENCOUNTER
Notified the patient of the below response by the provider. Patient verbalized understanding. Agrees to complete CXR and see ID. Denies any symptoms at this time. Requesting contact information for ID via [employer].

## 2023-02-13 NOTE — TELEPHONE ENCOUNTER
Received a call from ED lab, talked with Candida Thomas who states quantiferon TB result is positive. Please advise. Thank you.

## 2023-02-15 ENCOUNTER — HOSPITAL ENCOUNTER (OUTPATIENT)
Dept: GENERAL RADIOLOGY | Age: 48
Discharge: HOME | End: 2023-02-15
Attending: EMERGENCY MEDICINE
Payer: COMMERCIAL

## 2023-02-15 DIAGNOSIS — R76.12 POSITIVE QUANTIFERON-TB GOLD TEST: ICD-10-CM

## 2023-02-15 PROCEDURE — 71046 X-RAY EXAM CHEST 2 VIEWS: CPT | Performed by: EMERGENCY MEDICINE

## 2023-03-06 ENCOUNTER — OFFICE VISIT (OUTPATIENT)
Dept: FAMILY MEDICINE CLINIC | Facility: CLINIC | Age: 48
End: 2023-03-06
Payer: COMMERCIAL

## 2023-03-06 VITALS
DIASTOLIC BLOOD PRESSURE: 90 MMHG | BODY MASS INDEX: 32.66 KG/M2 | HEART RATE: 75 BPM | OXYGEN SATURATION: 100 % | HEIGHT: 62 IN | RESPIRATION RATE: 21 BRPM | SYSTOLIC BLOOD PRESSURE: 156 MMHG | WEIGHT: 177.5 LBS

## 2023-03-06 DIAGNOSIS — Z00.00 ENCOUNTER FOR ANNUAL PHYSICAL EXAMINATION EXCLUDING GYNECOLOGICAL EXAMINATION IN A PATIENT OLDER THAN 17 YEARS: Primary | ICD-10-CM

## 2023-03-06 DIAGNOSIS — R73.03 PREDIABETES: ICD-10-CM

## 2023-03-06 DIAGNOSIS — I10 HYPERTENSION, UNSPECIFIED TYPE: ICD-10-CM

## 2023-03-06 DIAGNOSIS — E66.9 OBESITY (BMI 30-39.9): ICD-10-CM

## 2023-03-06 DIAGNOSIS — Z00.00 LABORATORY EXAMINATION ORDERED AS PART OF A ROUTINE GENERAL MEDICAL EXAMINATION: ICD-10-CM

## 2023-03-06 DIAGNOSIS — Z12.11 SCREENING FOR COLON CANCER: ICD-10-CM

## 2023-03-06 DIAGNOSIS — Z12.31 ENCOUNTER FOR SCREENING MAMMOGRAM FOR MALIGNANT NEOPLASM OF BREAST: ICD-10-CM

## 2023-03-06 DIAGNOSIS — R29.818 SUSPECTED SLEEP APNEA: ICD-10-CM

## 2023-03-06 PROCEDURE — 3008F BODY MASS INDEX DOCD: CPT | Performed by: EMERGENCY MEDICINE

## 2023-03-06 PROCEDURE — 3077F SYST BP >= 140 MM HG: CPT | Performed by: EMERGENCY MEDICINE

## 2023-03-06 PROCEDURE — 3080F DIAST BP >= 90 MM HG: CPT | Performed by: EMERGENCY MEDICINE

## 2023-03-06 PROCEDURE — 99213 OFFICE O/P EST LOW 20 MIN: CPT | Performed by: EMERGENCY MEDICINE

## 2023-03-06 PROCEDURE — 99396 PREV VISIT EST AGE 40-64: CPT | Performed by: EMERGENCY MEDICINE

## 2023-03-06 RX ORDER — LISINOPRIL AND HYDROCHLOROTHIAZIDE 25; 20 MG/1; MG/1
1 TABLET ORAL DAILY
Qty: 90 TABLET | Refills: 1 | Status: SHIPPED | OUTPATIENT
Start: 2023-03-06

## 2023-03-06 RX ORDER — AMLODIPINE BESYLATE 5 MG/1
5 TABLET ORAL DAILY
Qty: 90 TABLET | Refills: 0 | Status: SHIPPED | OUTPATIENT
Start: 2023-03-06 | End: 2024-02-29

## 2023-03-20 ENCOUNTER — OFFICE VISIT (OUTPATIENT)
Dept: ORTHOPEDICS CLINIC | Facility: CLINIC | Age: 48
End: 2023-03-20
Payer: COMMERCIAL

## 2023-03-20 VITALS — BODY MASS INDEX: 32.57 KG/M2 | WEIGHT: 177 LBS | HEIGHT: 62 IN

## 2023-03-20 DIAGNOSIS — G56.01 CARPAL TUNNEL SYNDROME OF RIGHT WRIST: Primary | ICD-10-CM

## 2023-03-20 PROCEDURE — 99214 OFFICE O/P EST MOD 30 MIN: CPT | Performed by: ORTHOPAEDIC SURGERY

## 2023-03-20 PROCEDURE — 3008F BODY MASS INDEX DOCD: CPT | Performed by: ORTHOPAEDIC SURGERY

## 2023-03-20 NOTE — PROGRESS NOTES
SURGICAL BOOKING SHEET   Name: Lashaun Magana  MRN: [id number]   : 1975    Surgical Date:   2023   Surgical Consent:   Right endoscopic carpal tunnel is possible open   Diagnosis:    (G56.01) Carpal tunnel syndrome of right wrist  (primary encounter diagnosis)    Procedure Codes:   Carpal Tunnel Release Endoscopic (96452)   Disposition:   Outpatient   Operative Time:   15 mins   Antibiotics:   Ancef 2g   Anesthesia Type:   [place] Block   Clearance:    NONE   Equipment:   ECTR: Chambers Blade, Microaire Endoscopic System, Lead Hand (on standby), 5-0 moncryl, Exofin, Telfa+Tegaderm    Positioning:   Supine with arm table on transport cart   Assistant:   Assistant: Johanna Cruz PA-C   Follow Up:   7-10 days post op with Dharmesh Durand   Pain Medication:   Tramadol   Therapy:   None

## 2023-03-21 ENCOUNTER — TELEPHONE (OUTPATIENT)
Dept: ORTHOPEDICS CLINIC | Facility: CLINIC | Age: 48
End: 2023-03-21

## 2023-03-21 DIAGNOSIS — G56.01 CARPAL TUNNEL SYNDROME OF RIGHT WRIST: Primary | ICD-10-CM

## 2023-05-08 ENCOUNTER — LABORATORY ENCOUNTER (OUTPATIENT)
Dept: LAB | Age: 48
End: 2023-05-08
Payer: COMMERCIAL

## 2023-05-08 ENCOUNTER — EKG ENCOUNTER (OUTPATIENT)
Dept: LAB | Age: 48
End: 2023-05-08
Payer: COMMERCIAL

## 2023-05-08 DIAGNOSIS — E66.9 OBESITY (BMI 30-39.9): ICD-10-CM

## 2023-05-08 DIAGNOSIS — G56.01 CARPAL TUNNEL SYNDROME OF RIGHT WRIST: ICD-10-CM

## 2023-05-08 DIAGNOSIS — R73.03 PREDIABETES: ICD-10-CM

## 2023-05-08 DIAGNOSIS — Z00.00 LABORATORY EXAMINATION ORDERED AS PART OF A ROUTINE GENERAL MEDICAL EXAMINATION: ICD-10-CM

## 2023-05-08 LAB
ALBUMIN SERPL-MCNC: 3.8 G/DL (ref 3.4–5)
ALBUMIN/GLOB SERPL: 0.9 {RATIO} (ref 1–2)
ALP LIVER SERPL-CCNC: 61 U/L
ALT SERPL-CCNC: 38 U/L
ANION GAP SERPL CALC-SCNC: 3 MMOL/L (ref 0–18)
AST SERPL-CCNC: 23 U/L (ref 15–37)
ATRIAL RATE: 76 BPM
BASOPHILS # BLD AUTO: 0.14 X10(3) UL (ref 0–0.2)
BASOPHILS NFR BLD AUTO: 1.8 %
BILIRUB SERPL-MCNC: 0.6 MG/DL (ref 0.1–2)
BUN BLD-MCNC: 26 MG/DL (ref 7–18)
CALCIUM BLD-MCNC: 9.8 MG/DL (ref 8.5–10.1)
CHLORIDE SERPL-SCNC: 102 MMOL/L (ref 98–112)
CHOLEST SERPL-MCNC: 171 MG/DL (ref ?–200)
CO2 SERPL-SCNC: 29 MMOL/L (ref 21–32)
CREAT BLD-MCNC: 0.82 MG/DL
EOSINOPHIL # BLD AUTO: 0.27 X10(3) UL (ref 0–0.7)
EOSINOPHIL NFR BLD AUTO: 3.4 %
ERYTHROCYTE [DISTWIDTH] IN BLOOD BY AUTOMATED COUNT: 12.1 %
EST. AVERAGE GLUCOSE BLD GHB EST-MCNC: 137 MG/DL (ref 68–126)
FASTING PATIENT LIPID ANSWER: YES
FASTING STATUS PATIENT QL REPORTED: YES
GFR SERPLBLD BASED ON 1.73 SQ M-ARVRAT: 89 ML/MIN/1.73M2 (ref 60–?)
GLOBULIN PLAS-MCNC: 4.4 G/DL (ref 2.8–4.4)
GLUCOSE BLD-MCNC: 133 MG/DL (ref 70–99)
HBA1C MFR BLD: 6.4 % (ref ?–5.7)
HCT VFR BLD AUTO: 45.7 %
HDLC SERPL-MCNC: 67 MG/DL (ref 40–59)
HGB BLD-MCNC: 15.3 G/DL
IMM GRANULOCYTES # BLD AUTO: 0.04 X10(3) UL (ref 0–1)
IMM GRANULOCYTES NFR BLD: 0.5 %
LDLC SERPL CALC-MCNC: 92 MG/DL (ref ?–100)
LYMPHOCYTES # BLD AUTO: 2.61 X10(3) UL (ref 1–4)
LYMPHOCYTES NFR BLD AUTO: 32.6 %
MCH RBC QN AUTO: 28.8 PG (ref 26–34)
MCHC RBC AUTO-ENTMCNC: 33.5 G/DL (ref 31–37)
MCV RBC AUTO: 85.9 FL
MONOCYTES # BLD AUTO: 0.59 X10(3) UL (ref 0.1–1)
MONOCYTES NFR BLD AUTO: 7.4 %
NEUTROPHILS # BLD AUTO: 4.35 X10 (3) UL (ref 1.5–7.7)
NEUTROPHILS # BLD AUTO: 4.35 X10(3) UL (ref 1.5–7.7)
NEUTROPHILS NFR BLD AUTO: 54.3 %
NONHDLC SERPL-MCNC: 104 MG/DL (ref ?–130)
OSMOLALITY SERPL CALC.SUM OF ELEC: 285 MOSM/KG (ref 275–295)
P AXIS: 56 DEGREES
P-R INTERVAL: 168 MS
PLATELET # BLD AUTO: 380 10(3)UL (ref 150–450)
POTASSIUM SERPL-SCNC: 4.4 MMOL/L (ref 3.5–5.1)
PROT SERPL-MCNC: 8.2 G/DL (ref 6.4–8.2)
Q-T INTERVAL: 390 MS
QRS DURATION: 78 MS
QTC CALCULATION (BEZET): 438 MS
R AXIS: 49 DEGREES
RBC # BLD AUTO: 5.32 X10(6)UL
SODIUM SERPL-SCNC: 134 MMOL/L (ref 136–145)
T AXIS: 51 DEGREES
TRIGL SERPL-MCNC: 59 MG/DL (ref 30–149)
TSI SER-ACNC: 2.54 MIU/ML (ref 0.36–3.74)
VENTRICULAR RATE: 76 BPM
VLDLC SERPL CALC-MCNC: 10 MG/DL (ref 0–30)
WBC # BLD AUTO: 8 X10(3) UL (ref 4–11)

## 2023-05-08 PROCEDURE — 80061 LIPID PANEL: CPT

## 2023-05-08 PROCEDURE — 83036 HEMOGLOBIN GLYCOSYLATED A1C: CPT

## 2023-05-08 PROCEDURE — 80053 COMPREHEN METABOLIC PANEL: CPT

## 2023-05-08 PROCEDURE — 85025 COMPLETE CBC W/AUTO DIFF WBC: CPT

## 2023-05-08 PROCEDURE — 93005 ELECTROCARDIOGRAM TRACING: CPT

## 2023-05-08 PROCEDURE — 84443 ASSAY THYROID STIM HORMONE: CPT

## 2023-05-08 PROCEDURE — 93010 ELECTROCARDIOGRAM REPORT: CPT | Performed by: INTERNAL MEDICINE

## 2023-05-15 ENCOUNTER — OFFICE VISIT (OUTPATIENT)
Dept: FAMILY MEDICINE CLINIC | Facility: CLINIC | Age: 48
End: 2023-05-15
Payer: COMMERCIAL

## 2023-05-15 VITALS
HEIGHT: 62 IN | OXYGEN SATURATION: 98 % | RESPIRATION RATE: 25 BRPM | WEIGHT: 178 LBS | BODY MASS INDEX: 32.76 KG/M2 | SYSTOLIC BLOOD PRESSURE: 132 MMHG | HEART RATE: 86 BPM | DIASTOLIC BLOOD PRESSURE: 84 MMHG

## 2023-05-15 DIAGNOSIS — I10 HYPERTENSION, UNSPECIFIED TYPE: Primary | ICD-10-CM

## 2023-05-15 DIAGNOSIS — R73.03 PREDIABETES: ICD-10-CM

## 2023-05-15 DIAGNOSIS — E66.9 OBESITY (BMI 30-39.9): ICD-10-CM

## 2023-05-15 PROCEDURE — 3008F BODY MASS INDEX DOCD: CPT | Performed by: EMERGENCY MEDICINE

## 2023-05-15 PROCEDURE — 3079F DIAST BP 80-89 MM HG: CPT | Performed by: EMERGENCY MEDICINE

## 2023-05-15 PROCEDURE — 99214 OFFICE O/P EST MOD 30 MIN: CPT | Performed by: EMERGENCY MEDICINE

## 2023-05-15 PROCEDURE — 3075F SYST BP GE 130 - 139MM HG: CPT | Performed by: EMERGENCY MEDICINE

## 2023-05-15 RX ORDER — AMLODIPINE BESYLATE 5 MG/1
5 TABLET ORAL DAILY
Qty: 90 TABLET | Refills: 1 | Status: SHIPPED | OUTPATIENT
Start: 2023-05-15 | End: 2024-05-09

## 2023-05-15 NOTE — PATIENT INSTRUCTIONS
Thank you for choosing [facility]  To Do:  FOR JENY BURCIAGA    Arrange for mammogram when ready  Follow up in 6 months sooner if needed  Need to lose weight  Monitor BP 2-3 x a week

## 2023-05-18 ENCOUNTER — ANESTHESIA EVENT (OUTPATIENT)
Dept: SURGERY | Facility: HOSPITAL | Age: 48
End: 2023-05-18
Payer: COMMERCIAL

## 2023-05-19 ENCOUNTER — ANESTHESIA (OUTPATIENT)
Dept: SURGERY | Facility: HOSPITAL | Age: 48
End: 2023-05-19
Payer: COMMERCIAL

## 2023-05-19 ENCOUNTER — HOSPITAL ENCOUNTER (OUTPATIENT)
Facility: HOSPITAL | Age: 48
Setting detail: HOSPITAL OUTPATIENT SURGERY
Discharge: HOME | End: 2023-05-19
Attending: ORTHOPAEDIC SURGERY | Admitting: ORTHOPAEDIC SURGERY
Payer: COMMERCIAL

## 2023-05-19 VITALS
WEIGHT: 179 LBS | TEMPERATURE: 97 F | HEART RATE: 55 BPM | RESPIRATION RATE: 18 BRPM | SYSTOLIC BLOOD PRESSURE: 135 MMHG | BODY MASS INDEX: 32.94 KG/M2 | HEIGHT: 62 IN | OXYGEN SATURATION: 100 % | DIASTOLIC BLOOD PRESSURE: 90 MMHG

## 2023-05-19 DIAGNOSIS — G56.01 CARPAL TUNNEL SYNDROME OF RIGHT WRIST: Primary | ICD-10-CM

## 2023-05-19 LAB — B-HCG UR QL: NEGATIVE

## 2023-05-19 PROCEDURE — 76942 ECHO GUIDE FOR BIOPSY: CPT | Performed by: ANESTHESIOLOGY

## 2023-05-19 PROCEDURE — 81025 URINE PREGNANCY TEST: CPT

## 2023-05-19 PROCEDURE — 01N54ZZ RELEASE MEDIAN NERVE, PERCUTANEOUS ENDOSCOPIC APPROACH: ICD-10-PCS | Performed by: ORTHOPAEDIC SURGERY

## 2023-05-19 RX ORDER — SODIUM CHLORIDE, SODIUM LACTATE, POTASSIUM CHLORIDE, CALCIUM CHLORIDE 600; 310; 30; 20 MG/100ML; MG/100ML; MG/100ML; MG/100ML
INJECTION, SOLUTION INTRAVENOUS CONTINUOUS
Status: DISCONTINUED | OUTPATIENT
Start: 2023-05-19 | End: 2023-05-19

## 2023-05-19 RX ORDER — HYDROCODONE BITARTRATE AND ACETAMINOPHEN 5; 325 MG/1; MG/1
2 TABLET ORAL ONCE AS NEEDED
Status: COMPLETED | OUTPATIENT
Start: 2023-05-19 | End: 2023-05-19

## 2023-05-19 RX ORDER — LIDOCAINE HYDROCHLORIDE 5 MG/ML
INJECTION, SOLUTION INFILTRATION; INTRAVENOUS AS NEEDED
Status: DISCONTINUED | OUTPATIENT
Start: 2023-05-19 | End: 2023-05-19 | Stop reason: SURG

## 2023-05-19 RX ORDER — ONDANSETRON 2 MG/ML
4 INJECTION INTRAMUSCULAR; INTRAVENOUS EVERY 6 HOURS PRN
Status: DISCONTINUED | OUTPATIENT
Start: 2023-05-19 | End: 2023-05-19

## 2023-05-19 RX ORDER — TRAMADOL HYDROCHLORIDE 50 MG/1
50 TABLET ORAL EVERY 6 HOURS PRN
Qty: 5 TABLET | Refills: 1 | Status: SHIPPED | OUTPATIENT
Start: 2023-05-19

## 2023-05-19 RX ORDER — PROCHLORPERAZINE EDISYLATE 5 MG/ML
5 INJECTION INTRAMUSCULAR; INTRAVENOUS EVERY 8 HOURS PRN
Status: DISCONTINUED | OUTPATIENT
Start: 2023-05-19 | End: 2023-05-19

## 2023-05-19 RX ORDER — HYDROMORPHONE HYDROCHLORIDE 1 MG/ML
0.2 INJECTION, SOLUTION INTRAMUSCULAR; INTRAVENOUS; SUBCUTANEOUS EVERY 5 MIN PRN
Status: DISCONTINUED | OUTPATIENT
Start: 2023-05-19 | End: 2023-05-19

## 2023-05-19 RX ORDER — HYDROMORPHONE HYDROCHLORIDE 1 MG/ML
0.6 INJECTION, SOLUTION INTRAMUSCULAR; INTRAVENOUS; SUBCUTANEOUS EVERY 5 MIN PRN
Status: DISCONTINUED | OUTPATIENT
Start: 2023-05-19 | End: 2023-05-19

## 2023-05-19 RX ORDER — ACETAMINOPHEN 500 MG
1000 TABLET ORAL ONCE AS NEEDED
Status: COMPLETED | OUTPATIENT
Start: 2023-05-19 | End: 2023-05-19

## 2023-05-19 RX ORDER — NALOXONE HYDROCHLORIDE 0.4 MG/ML
80 INJECTION, SOLUTION INTRAMUSCULAR; INTRAVENOUS; SUBCUTANEOUS AS NEEDED
Status: DISCONTINUED | OUTPATIENT
Start: 2023-05-19 | End: 2023-05-19

## 2023-05-19 RX ORDER — SCOLOPAMINE TRANSDERMAL SYSTEM 1 MG/1
1 PATCH, EXTENDED RELEASE TRANSDERMAL ONCE
Status: DISCONTINUED | OUTPATIENT
Start: 2023-05-19 | End: 2023-05-19

## 2023-05-19 RX ORDER — HYDROCODONE BITARTRATE AND ACETAMINOPHEN 5; 325 MG/1; MG/1
1 TABLET ORAL ONCE AS NEEDED
Status: COMPLETED | OUTPATIENT
Start: 2023-05-19 | End: 2023-05-19

## 2023-05-19 RX ORDER — CEFAZOLIN SODIUM/WATER 2 G/20 ML
SYRINGE (ML) INTRAVENOUS
Status: DISCONTINUED
Start: 2023-05-19 | End: 2023-05-19

## 2023-05-19 RX ORDER — ACETAMINOPHEN 500 MG
1000 TABLET ORAL ONCE
Status: DISCONTINUED | OUTPATIENT
Start: 2023-05-19 | End: 2023-05-19 | Stop reason: HOSPADM

## 2023-05-19 RX ORDER — MIDAZOLAM HYDROCHLORIDE 1 MG/ML
INJECTION INTRAMUSCULAR; INTRAVENOUS AS NEEDED
Status: DISCONTINUED | OUTPATIENT
Start: 2023-05-19 | End: 2023-05-19 | Stop reason: SURG

## 2023-05-19 RX ORDER — CEFAZOLIN SODIUM/WATER 2 G/20 ML
2 SYRINGE (ML) INTRAVENOUS ONCE
Status: COMPLETED | OUTPATIENT
Start: 2023-05-19 | End: 2023-05-19

## 2023-05-19 RX ORDER — HYDROMORPHONE HYDROCHLORIDE 1 MG/ML
0.4 INJECTION, SOLUTION INTRAMUSCULAR; INTRAVENOUS; SUBCUTANEOUS EVERY 5 MIN PRN
Status: DISCONTINUED | OUTPATIENT
Start: 2023-05-19 | End: 2023-05-19

## 2023-05-19 RX ADMIN — LIDOCAINE HYDROCHLORIDE 40 ML: 5 INJECTION, SOLUTION INFILTRATION; INTRAVENOUS at 07:20:00

## 2023-05-19 RX ADMIN — CEFAZOLIN SODIUM/WATER 2 G: 2 G/20 ML SYRINGE (ML) INTRAVENOUS at 07:21:00

## 2023-05-19 RX ADMIN — MIDAZOLAM HYDROCHLORIDE 1 MG: 1 INJECTION INTRAMUSCULAR; INTRAVENOUS at 07:18:00

## 2023-05-19 RX ADMIN — SODIUM CHLORIDE, SODIUM LACTATE, POTASSIUM CHLORIDE, CALCIUM CHLORIDE: 600; 310; 30; 20 INJECTION, SOLUTION INTRAVENOUS at 07:48:00

## 2023-05-19 RX ADMIN — MIDAZOLAM HYDROCHLORIDE 1 MG: 1 INJECTION INTRAMUSCULAR; INTRAVENOUS at 07:30:00

## 2023-05-19 NOTE — OPERATIVE REPORT
Operative Note    Patient Name: Gera Estrada    Preoperative Diagnosis:     1. Carpal tunnel syndrome of right wrist [G56.01]    Postoperative Diagnosis:     1. Carpal tunnel syndrome of right wrist [G56.01]    Surgeon(s) and Role:     Goldie Obando MD - Primary     Assistant: Peggy Marie PA-C    A PA was needed for the successful completion of this case. She was essential for the proper positioning of patient, manipulation of instruments, proper exposure, manipulation of soft tissue, and wound closure. Procedures:     1. Right endoscopic carpal tunnel release (19593)    Antibiotics: Ancef 2g    Surgical Findings: Normal Anatomy     Anesthesia: Demetrio Block    Complications: None    Implants: None    Specimen: None    Condition: Stable    Estimated Blood Loss: 1mL    Indications:  52year old female with EMG/NCV confirmed right carpal tunnel syndrome that failed non-surgical management. Patient consented to an endoscopic carpal tunnel release possible open having understood the risks associated with surgery, expected outcome, time to recovery and the need for possible rehab. Risks that were discussed but not limited to infection, nerve injury, tendon injury, artery injury, need for additional surgery, and no improvements of present symptoms. Procedure:  Patient was met in the preoperative holding area where consent was verified, laterality was marked with the surgeon's initials, and the H&P was updated. Patient was brought to the operating room on a transport cart. Patient was then transferred onto the operating room table and placed in supine position with an arm table and with bony prominences well-padded. SCDs were placed. Antibiotics were fully infused. An upper arm tourniquet was placed and the limb was exsanguinated using Esmarch bandage. Tourniquet was raised to 250mmHg. A demetrio block was subsequently performed. The arm was then prepped and draped in the usual sterile fashion.  A surgical timeout was performed. A transverse incision through the dermis was made 5mm proximal to the distal volar wrist crease just ulnar to the palmaris longus using a 15 blade. Adson's and Mayur Rumble scissors was used to dissect through the subcutaneous tissue onto the antebrachial fascia. A distally based 1 cm wide rectangular flap was elevated using a [place] blade. The flap was retracted distally and volarly allowing access to the carpal tunnel. The undersurface of the transverse carpal ligament was accessed, cleaned, and dilated. After assessing the width of the ligament, the microaire endoscopic apparatus with camera was inserted into the carpal tunnel. Under camera magnification with direct visualization of the undersurface of the transverse carpal ligament, the blade was engaged at the distal extents of the ligament and the release of the ligament was carried out distal to proximal.  I verified the release with the endoscopy camera noting divergent edges of the transverse carpal ligament. I also physically verified with a tenosynovium elevator complete release of the transverse carpal ligament. The tourniquet was then lowered and bipolar cautery was used to achieve hemostasis. Closure: The incision was closed using 5-0 Monocryl in a buried simple interrupted fashion. Exofin skin glue was applied and Steri-Strips were placed. Dressing/Splint:  Tegaderm with a pad was applied over the endoscopic carpal tunnel incision. Post Operative:  Patient was woken up from anesthesia and taken to PACU for further recovery and discharge home. Jackie Barron MD  Hand, Wrist, & Elbow Surgery  [facility]  [facility], [address] [facility], [address] [place] [address]  [email or web address]  t: [id number]  f: [phone]

## 2023-05-19 NOTE — ANESTHESIA POSTPROCEDURE EVALUATION
[address] [place] Patient Status:  Hospital Outpatient Surgery   Age/Gender 52year old female MRN [id number]   [facility] SURGERY Attending Boy Fuentes MD   Hosp Day # 0 PCP Gem Gomez MD       Anesthesia Post-op Note    RIGHT ENDOSCOPIC CARPAL TUNNEL RELEASE     Procedure Summary     Date: 05/19/23 Room / Location: [facility] MAIN OR 18 / [facility] OR    Anesthesia Start: 0715 Anesthesia Stop: 6179    Procedure: RIGHT ENDOSCOPIC CARPAL TUNNEL RELEASE (Right) Diagnosis:       Carpal tunnel syndrome of right wrist      (Carpal tunnel syndrome of right wrist [G56.01])    Surgeons: Boy Fuentes MD Anesthesiologist: Phuc Kruse MD    Anesthesia Type: Luis block, MAC ASA Status: 2          Anesthesia Type: Luis block, MAC    Vitals Value Taken Time   /100 05/19/23 0749   Temp 97.9 05/19/23 0749   Pulse 72 05/19/23 0749   Resp 18 05/19/23 0749   SpO2 100 05/19/23 0749       Patient Location: Same Day Surgery    Anesthesia Type: Luis block    Airway Patency: patent    Postop Pain Control: adequate    Mental Status: preanesthetic baseline    Nausea/Vomiting: none    Cardiopulmonary/Hydration status: stable euvolemic    Complications: no apparent anesthesia related complications    Postop vital signs: stable    Dental Exam: Unchanged from Preop    Patient to be discharged home when criteria met.

## 2023-05-19 NOTE — ANESTHESIA PROCEDURE NOTES
Regional Block    Date/Time: 5/19/2023 7:19 AM    Performed by: Salo Melton MD  Authorized by: Salo Melton MD      General Information and Staff    Start Time:  5/19/2023 7:19 AM  Anesthesiologist:  Salo Melton MD  Performed by: Anesthesiologist  Patient Location:  OR    Block Placement: Pre Induction  Site Identification: real time ultrasound guided and image stored and retrievable    Block site/laterality marked before start: site marked  Reason for Block: at surgeon's request and post-op pain management    Preanesthetic Checklist: 2 patient identifers, IV checked, site marked, risks and benefits discussed, monitors and equipment checked, pre-op evaluation, timeout performed, anesthesia consent, sterile technique used, no prohibitive neurological deficits and no local skin infection at insertion site      Procedure Details    Patient Position:  Supine  Prep: ChloraPrep    Monitoring:  Cardiac monitor, continuous pulse ox and blood pressure cuff  Block Type:  Luis block  Laterality:  Right  Injection Technique:  Single-shot    Needle    Needle Type:  Short-bevel and echogenic  Needle Localization:  Ultrasound guidance  Reason for Ultrasound Use: appropriate spread of the medication was noted in real time and no ultrasound evidence of intravascular and/or intraneural injection            Assessment    Injection Assessment:  Good spread noted, negative resistance, negative aspiration for heme, incremental injection and low pressure  Heart Rate Change: No    - Patient tolerated block procedure well without evidence of immediate block related complications. Medications  5/19/2023 7:19 AM      Additional Comments    Medication:  Lidocaine 0.5% 40 ml placed into right hand vein after arm exsanguinated and tourniquet inflated.  Tolerated well

## 2023-08-14 ENCOUNTER — PATIENT MESSAGE (OUTPATIENT)
Dept: FAMILY MEDICINE CLINIC | Facility: CLINIC | Age: 48
End: 2023-08-14

## 2023-08-14 ENCOUNTER — PATIENT MESSAGE (OUTPATIENT)
Dept: ORTHOPEDICS CLINIC | Facility: CLINIC | Age: 48
End: 2023-08-14

## 2023-08-14 NOTE — TELEPHONE ENCOUNTER
Patient is requesting a note to return to work with a weight restriction. Patient had surgery on 5/19/23 but never came in for a post op.  Please advise

## 2023-08-14 NOTE — TELEPHONE ENCOUNTER
From: Jed Jim  To: Justino Schwartz MD  Sent: 8/14/2023 3:35 PM CDT  Subject: Return to work Approval    Hi Good Afternoon Dr. Marcio Abarca,    I would like to request documentation from your office a return to work approval with weight limitation after my Carpal Tunnel Release Surgery as per my employers request.    Thank you.     Cedric Mclaughlin

## 2023-08-15 NOTE — TELEPHONE ENCOUNTER
From: Guillermo Asher  To: Jarek Hawley MD  Sent: 8/14/2023 3:51 PM CDT  Subject: Return to work approval request    Hi Dr. Moody Mallory,    Janak Afternoon. I would like to ask assistance to request a Return to work approval with weight limitation after my Carpal Tunnel Release Surgery last May 19. My Short Term Disability was approved From May 18-July 20th by Dr. Laura Wilson. My employer requested a documentation for my return to work status. Thank you for your assistance.     Estefani Merritt

## 2023-08-15 NOTE — TELEPHONE ENCOUNTER
Patient states 8/15/23 is okay for return to work date. Please sign and it will be sent to her MyChart. Thanks!

## 2023-08-16 NOTE — TELEPHONE ENCOUNTER
Wilson Covarrubias   to [id number] (supporting Eric Darling MD)         8/16/23  4:16 PM  Thank you Dr. Tabitha Bryson and Ms. Sorenson for the letter. Appreciate it. Wilson Covarrubias   to [facility] (supporting Eric Darling MD)         8/16/23  4:15 PM  Susan Mg seen it already.  Thank you

## 2023-09-26 ENCOUNTER — PATIENT MESSAGE (OUTPATIENT)
Dept: FAMILY MEDICINE CLINIC | Facility: CLINIC | Age: 48
End: 2023-09-26

## 2023-09-26 ENCOUNTER — HOSPITAL ENCOUNTER (OUTPATIENT)
Dept: MAMMOGRAPHY | Age: 48
Discharge: HOME | End: 2023-09-26
Attending: EMERGENCY MEDICINE
Payer: COMMERCIAL

## 2023-09-26 DIAGNOSIS — I10 HYPERTENSION, UNSPECIFIED TYPE: ICD-10-CM

## 2023-09-26 DIAGNOSIS — Z12.4 CERVICAL CANCER SCREENING: Primary | ICD-10-CM

## 2023-09-26 DIAGNOSIS — Z12.31 ENCOUNTER FOR SCREENING MAMMOGRAM FOR MALIGNANT NEOPLASM OF BREAST: ICD-10-CM

## 2023-09-26 PROCEDURE — 77067 SCR MAMMO BI INCL CAD: CPT | Performed by: EMERGENCY MEDICINE

## 2023-09-26 PROCEDURE — 77063 BREAST TOMOSYNTHESIS BI: CPT | Performed by: EMERGENCY MEDICINE

## 2023-09-26 RX ORDER — LISINOPRIL AND HYDROCHLOROTHIAZIDE 25; 20 MG/1; MG/1
1 TABLET ORAL DAILY
Qty: 90 TABLET | Refills: 1 | Status: SHIPPED | OUTPATIENT
Start: 2023-09-26

## 2023-09-26 NOTE — TELEPHONE ENCOUNTER
From: Kelvin Child  To: Luis Gardner  Sent: 9/26/2023 5:03 PM CDT  Subject: Cervical cancer screening      Hi Dr Vishnu Mercado! I also have a cervical cancer screening scheduled for November 2023. Could you please recommend or refer me to a doctor for this test as well? Thank you.     Janeth Bright

## 2023-09-26 NOTE — TELEPHONE ENCOUNTER
From: Carmela Foote  To: Yesica Edwards  Sent: 9/26/2023 2:05 PM CDT  Subject: Colorectal cancer screening overdue      Hi Dr Iram العراقي,    I was schedule for colorectal cancer screening, but I have not received a kit needed to complete the test. Please let me know how to get one so I can do and submit it. Thank you.     Corrinne Prayer

## 2023-09-29 DIAGNOSIS — R92.30 DENSE BREAST TISSUE ON MAMMOGRAM: Primary | ICD-10-CM

## 2023-12-01 DIAGNOSIS — I10 HYPERTENSION, UNSPECIFIED TYPE: ICD-10-CM

## 2023-12-04 DIAGNOSIS — I10 HYPERTENSION, UNSPECIFIED TYPE: ICD-10-CM

## 2023-12-04 RX ORDER — AMLODIPINE BESYLATE 5 MG/1
5 TABLET ORAL DAILY
Qty: 30 TABLET | Refills: 0 | Status: SHIPPED | OUTPATIENT
Start: 2023-12-04

## 2023-12-04 RX ORDER — AMLODIPINE BESYLATE 5 MG/1
5 TABLET ORAL DAILY
Qty: 90 TABLET | Refills: 0 | OUTPATIENT
Start: 2023-12-04

## 2023-12-04 NOTE — TELEPHONE ENCOUNTER
30 day supply sent. Pt is due back in the office for BP f/u before further refills. PSR: pls contact pt to schedule BP f/u in office.

## 2024-02-05 DIAGNOSIS — I10 HYPERTENSION, UNSPECIFIED TYPE: ICD-10-CM

## 2024-02-05 RX ORDER — AMLODIPINE BESYLATE 5 MG/1
5 TABLET ORAL DAILY
Qty: 30 TABLET | Refills: 0 | Status: SHIPPED | OUTPATIENT
Start: 2024-02-05

## 2024-02-05 NOTE — TELEPHONE ENCOUNTER
Medication(s) to Refill:   Requested Prescriptions     Pending Prescriptions Disp Refills    AMLODIPINE 5 MG Oral Tab [Pharmacy Med Name: AMLODIPINE BESYLATE 5MG TABLETS] 30 tablet 0     Sig: TAKE 1 TABLET(5 MG) BY MOUTH DAILY         Reason for Medication Refill being sent to Provider / Reason Protocol Failed:  [] 90 day refill has already been granted  [] Blood Pressure out of range  [] Labs Abnormal/over due  [] Medication not previously prescribed by Provider  [] Non-Protocol Medication  [] Controlled Substance   [] Due for appointment- no future appointment scheduled  [] No Follow up specified      Last Time Medication was Filled:  12/4/23      Last Office Visit with PCP: 5/15/23    When Patient was Due Back to the Office:  6 months  (from when PCP last addressed condition)    Future Appointments:  Future Appointments   Date Time Provider Department Center   3/13/2024 10:00 AM Meri Jaeger APRN [facility]         Last Blood Pressures:  BP Readings from Last 2 Encounters:   05/19/23 135/90   05/15/23 132/84         Action taken:  [] Refill approved per protocol  [] Routing to provider for approval

## 2024-02-06 RX ORDER — AMLODIPINE BESYLATE 5 MG/1
5 TABLET ORAL DAILY
Qty: 90 TABLET | Refills: 0 | OUTPATIENT
Start: 2024-02-06

## 2024-02-06 NOTE — TELEPHONE ENCOUNTER
Amlodipine refilled  Needs office visit before next refill, 30 d supply only due for HTN recheck,

## 2024-02-08 ENCOUNTER — OFFICE VISIT (OUTPATIENT)
Dept: FAMILY MEDICINE CLINIC | Facility: CLINIC | Age: 49
End: 2024-02-08
Payer: COMMERCIAL

## 2024-02-08 VITALS
DIASTOLIC BLOOD PRESSURE: 80 MMHG | WEIGHT: 195 LBS | BODY MASS INDEX: 35.88 KG/M2 | HEART RATE: 85 BPM | OXYGEN SATURATION: 98 % | SYSTOLIC BLOOD PRESSURE: 136 MMHG | HEIGHT: 62 IN | RESPIRATION RATE: 18 BRPM

## 2024-02-08 DIAGNOSIS — Z13.0 SCREENING FOR ENDOCRINE, NUTRITIONAL, METABOLIC AND IMMUNITY DISORDER: ICD-10-CM

## 2024-02-08 DIAGNOSIS — R73.03 PREDIABETES: ICD-10-CM

## 2024-02-08 DIAGNOSIS — I10 HYPERTENSION, UNSPECIFIED TYPE: Primary | ICD-10-CM

## 2024-02-08 DIAGNOSIS — Z13.228 SCREENING FOR ENDOCRINE, NUTRITIONAL, METABOLIC AND IMMUNITY DISORDER: ICD-10-CM

## 2024-02-08 DIAGNOSIS — E66.9 OBESITY (BMI 30-39.9): ICD-10-CM

## 2024-02-08 DIAGNOSIS — R29.818 SUSPECTED SLEEP APNEA: ICD-10-CM

## 2024-02-08 DIAGNOSIS — Z13.29 SCREENING FOR ENDOCRINE, NUTRITIONAL, METABOLIC AND IMMUNITY DISORDER: ICD-10-CM

## 2024-02-08 DIAGNOSIS — Z13.21 SCREENING FOR ENDOCRINE, NUTRITIONAL, METABOLIC AND IMMUNITY DISORDER: ICD-10-CM

## 2024-02-08 PROCEDURE — 99214 OFFICE O/P EST MOD 30 MIN: CPT | Performed by: EMERGENCY MEDICINE

## 2024-02-08 RX ORDER — LISINOPRIL AND HYDROCHLOROTHIAZIDE 25; 20 MG/1; MG/1
1 TABLET ORAL DAILY
Qty: 90 TABLET | Refills: 1 | Status: SHIPPED | OUTPATIENT
Start: 2024-02-08

## 2024-02-08 RX ORDER — IBUPROFEN 800 MG/1
800 TABLET ORAL AS NEEDED
Refills: 0 | Status: CANCELLED | OUTPATIENT
Start: 2024-02-08

## 2024-02-08 RX ORDER — TRAMADOL HYDROCHLORIDE 50 MG/1
50 TABLET ORAL EVERY 6 HOURS PRN
Qty: 5 TABLET | Refills: 1 | Status: CANCELLED | OUTPATIENT
Start: 2024-02-08

## 2024-02-08 RX ORDER — AMLODIPINE BESYLATE 5 MG/1
5 TABLET ORAL DAILY
Qty: 30 TABLET | Refills: 0 | Status: SHIPPED | OUTPATIENT
Start: 2024-02-08

## 2024-02-08 NOTE — PROGRESS NOTES
Chief Complaint:   Chief Complaint   Patient presents with    Follow - Up     Blood pressure      HPI:   This is a 48 year old female         HYPERTENSION     On lisinopril-hydrochlorothiazide and Amlodipine  Reports good compliance  Home BP reading normotensive  Feels well no new Sx  Admits to liberal diet. Admits to a lot of food intake during the holiday  Denies BInge eating  Compliant with all medications  Admits to weight gain, very little activity  Reports eating a lot more                 Wt Readings from Last 6 Encounters:   02/08/24 195 lb (88.5 kg)   05/19/23 179 lb (81.2 kg)   05/15/23 178 lb (80.7 kg)   03/20/23 177 lb (80.3 kg)   03/06/23 177 lb 8 oz (80.5 kg)   11/13/22 170 lb (77.1 kg)         [facility]       Past Medical History:   Diagnosis Date    Essential hypertension     High blood pressure     Obesity     Visual impairment     reading glasses     History reviewed. No pertinent surgical history.  Social History:  Social History     Socioeconomic History    Marital status:    Tobacco Use    Smoking status: Never    Smokeless tobacco: Never   Vaping Use    Vaping Use: Never used   Substance and Sexual Activity    Alcohol use: Not Currently    Drug use: Never   Other Topics Concern    Caffeine Concern No    Exercise No    Seat Belt No    Special Diet No    Stress Concern No    Weight Concern No     Family History:  Family History   Problem Relation Age of Onset    Hypertension Mother     Diabetes Father      Allergies:  No Known Allergies  Current Meds:  Current Outpatient Medications on File Prior to Visit   Medication Sig Dispense Refill    traMADol 50 MG Oral Tab Take 1 tablet (50 mg total) by mouth every 6 (six) hours as needed for Pain. 5 tablet 1    ibuprofen 800 MG Oral Tab as needed.       No current facility-administered medications on file prior to visit.      Counseling given: Not Answered         PROBLEM LIST     Patient Active Problem List   Diagnosis    Hypertension    Bilateral  carpal tunnel syndrome    Suspected sleep apnea    Snoring    Prediabetes    Obesity (BMI 30-39.9)         REVIEW OF SYSTEMS:   Review of systems significant for weight gain  The rest of the review of systems is negative except those stated as above    PHYSICAL EXAM:   /80   Pulse 85   Resp 18   Ht 5' 2\" (1.575 m)   Wt 195 lb (88.5 kg)   LMP 01/20/2024 (Approximate)   SpO2 98%   BMI 35.67 kg/m²  Estimated body mass index is 35.67 kg/m² as calculated from the following:    Height as of this encounter: 5' 2\" (1.575 m).    Weight as of this encounter: 195 lb (88.5 kg).   Vital signs reviewed.Appears stated age, well groomed.  GENERAL: well developed, well nourished, well hydrated, no distress  SKIN: good skin turgor, no obvious rashes  HEENT: atraumatic, normocephalic, ears, nose and throat are clear  EYES: sclera non icteric bilateral  NECK: supple, no adenopathy, no thyromegaly  LUNGS: clear to auscultation, no RRW  CARDIO: RRR without murmur  EXTREMITIES: no cyanosis, clubbing or edema    No results found for this or any previous visit (from the past 672 hour(s)).        ASSESSMENT AND PLAN:         1. Hypertension, unspecified type  - lisinopril-hydroCHLOROthiazide 20-25 MG Oral Tab; Take 1 tablet by mouth daily.  Dispense: 90 tablet; Refill: 1  - amLODIPine 5 MG Oral Tab; Take 1 tablet (5 mg total) by mouth daily.  Dispense: 30 tablet; Refill: 0    2. Obesity (BMI 30-39.9)  - Jump Start Your Health; Future  Counseled on weight loss   Counseled on mindful eating,  Follow up with [facility]    3. Prediabetes  - Hemoglobin A1C; Future  Counseled on weight loss  Avoid further weight gain    4. Suspected sleep apnea  - OP REFERRAL TO DIAGNOSTIC SLEEP STUDY    5. Screening for endocrine, nutritional, metabolic and immunity disorder  - CBC With Differential With Platelet; Future  - Comp Metabolic Panel (14); Future  - Lipid Panel; Future  - TSH W Reflex To Free T4; Future        PATIENT INSTRUCTIONS:    Continue  follow up with weight loss clinic  Follow up in 3 months for annual physical  Have blood tests done before annual physical  Ccontinue with all medications  Arrange for sleep study    FOLLOW UP: 3 months          Health Maintenance Due   Topic Date Due    Colorectal Cancer Screening  Never done    COVID-19 Vaccine (5 - 2023-24 season) 09/01/2023    Influenza Vaccine (1) 10/01/2023    Pap Smear  11/10/2023    Annual Depression Screening  01/01/2024    Annual Physical  03/06/2024

## 2024-02-08 NOTE — PATIENT INSTRUCTIONS
Thank you for choosing [facility]  To Do:  FOR JENY BURCIAGA    Continue follow up with weight loss clinic  Follow up in 3 months for annual physical  Have blood tests done before annual physical  Ccontinue with all medications  Arrange for sleep study

## 2024-03-13 ENCOUNTER — PATIENT MESSAGE (OUTPATIENT)
Dept: INTERNAL MEDICINE CLINIC | Facility: CLINIC | Age: 49
End: 2024-03-13

## 2024-03-13 ENCOUNTER — OFFICE VISIT (OUTPATIENT)
Dept: INTERNAL MEDICINE CLINIC | Facility: CLINIC | Age: 49
End: 2024-03-13
Payer: COMMERCIAL

## 2024-03-13 VITALS
HEIGHT: 62 IN | WEIGHT: 189 LBS | HEART RATE: 86 BPM | DIASTOLIC BLOOD PRESSURE: 74 MMHG | RESPIRATION RATE: 16 BRPM | SYSTOLIC BLOOD PRESSURE: 122 MMHG | BODY MASS INDEX: 34.78 KG/M2

## 2024-03-13 DIAGNOSIS — I10 HYPERTENSION, UNSPECIFIED TYPE: ICD-10-CM

## 2024-03-13 DIAGNOSIS — R29.818 SUSPECTED SLEEP APNEA: ICD-10-CM

## 2024-03-13 DIAGNOSIS — E66.9 OBESITY (BMI 30-39.9): ICD-10-CM

## 2024-03-13 DIAGNOSIS — R73.03 PREDIABETES: Primary | ICD-10-CM

## 2024-03-13 DIAGNOSIS — Z51.81 THERAPEUTIC DRUG MONITORING: Primary | ICD-10-CM

## 2024-03-13 DIAGNOSIS — R73.03 PREDIABETES: ICD-10-CM

## 2024-03-13 PROCEDURE — 99204 OFFICE O/P NEW MOD 45 MIN: CPT | Performed by: NURSE PRACTITIONER

## 2024-03-13 NOTE — PROGRESS NOTES
HISTORY OF PRESENT ILLNESS  Chief Complaint   Patient presents with    Weight Problem     Recommendation from PCP, never try meds and open for meds     Wilson Covarrubias is a 48 year old female new to our office today for initiation of medical weight loss program.  Patient presents today with c/o excess weight. Referred by, pCP    Has been struggling weight gain for the past couple of years..   A1c is 6.4%, family hx of diabetes in father (is not currently taking any medication for this)   Admits to eating a Japanese diet (lots of rice)    Denies chest pain, shortness of breath, dizziness, blurred vision, headache, paresthesia, nausea/vomiting.     Reason/goal for weight loss: My goal is to gradually lose weight for the benefit of maintaining normal blood pressure and blood sugar levels in 6 months and Maintain a healthy wieght and reach normal BMI in 1 year  Triggers for weight gain? Stress  Previous weight loss programs? NO  Previous weight loss medications?  none    Reviewed [facility] patient contract: Readiness for Lifestyle change: 10/10, Interest in Medication: 10/10, Bariatric surgery interest: 0/10    Weight  Starting weight: 189  Max weight: 198  Lowest weight: 145    Wt Readings from Last 6 Encounters:   03/13/24 189 lb (85.7 kg)   02/08/24 195 lb (88.5 kg)   05/19/23 179 lb (81.2 kg)   05/15/23 178 lb (80.7 kg)   03/20/23 177 lb (80.3 kg)   03/06/23 177 lb 8 oz (80.5 kg)        Typical diet   Breakfast Lunch Dinner Snacks Fluids   1/2 cup rice, 2 slices of gilmore, protein, fruits  1 cup of rice, chicken soup- vegs and protein 1 cup of rice, protein, and vegs crackers Water: adequate   Soda:  Juice:  Coffee/Tea:     Portion: medium  Eats 3 meals per day: yes   Number of restaurant or fast food meals/week: 0-1 meals/week    Social hx and lifestyle reviewed:    Work: [employer]   Marital status:   Support: yes  Tobacco use: none  ETOH use: 0  per/week  Supplements: none  Exercise: 4 days per week-  lifting   Stress level: 5/10  Sleep hours and integrity: 5 Hours per night    MEDICAL HISTORY  PMH reviewed:   Cardiac disorders: HTN   Depression/anxiety: negative  Glaucoma: negative  Kidney stones: negative  Eating disorder: negative  Migraines/seizures: negative  Joint-related conditions: negative  Liver disease: negative  Thyroid disease: negative  Constipation: negative  Diabetes: prediabetes  Sleep Apnea hx: +snoring- can't get sleep study (is waiting for insurance to get approve)   Cancer hx: negative  DVT: negative  Family or personal history of Pancreatic issues / Medullary Thyroid Cancer: negative  History of bariatric surgery: negative    FMH reviewed: obesity in parent/s or sibling: none    REVIEW OF SYSTEMS  GENERAL: feels well otherwise, and negative fatigue   SKIN: denies any rashes to skin folds  HEENT: snoring- yes; denies neck thickening  LUNGS: denies shortness of breath with exertion, no apnea  CARDIOVASCULAR: denies chest pain on exertion, denies palpitations or pedal edema  GI: denies abdominal pain, distention, No N/V/D/C  MUSCULOSKELETAL: denies back pain, joint pains   NEURO: denies headaches or dizziness  ENDOCRINE: denies any excess hunger, urination or thirst, denies any purple striae  PSYCH: denies change in behavior or mood, denies feeling sad or depressed    EXAM  /74   Pulse 86   Resp 16   Ht 5' 2\" (1.575 m)   Wt 189 lb (85.7 kg)   LMP 01/20/2024 (Approximate)   BMI 34.57 kg/m² , Percent body fat: F >32% Female 41.2%  visceral fat 12 Muscle Mass: 27.3lbs%       GENERAL: well developed, well nourished, in no apparent distress  SKIN: warm, pink, dry without rashes to exposed area   EYES: conjunctiva pink, sclera non icteric, PERRLA  HEENT: atraumatic, normocephalic, O/p: Mallampati score- 2  NECK: supple, non tender, no adenopathy, no thyromegaly  LUNGS: CTA in all fields, breathing non labored  CARDIO: RRR without murmur, normal S1 and S2 without clicks or gallops, no  pedal edema  GI: +BS, soft, no masses, HSM or tenderness  EXTREMITIES: grossly intact  NEURO: Oriented times three, full ROM of bilateral UE/LE  PSYCH: pleasant, cooperative, normal mood and affect    Lab Results   Component Value Date     (H) 05/08/2023    BUN 26 (H) 05/08/2023    BUNCREA 21.5 (H) 11/17/2020    CREATSERUM 0.82 05/08/2023    ANIONGAP 3 05/08/2023    GFRNAA 69 05/18/2022    GFRAA 79 05/18/2022    CA 9.8 05/08/2023    OSMOCALC 285 05/08/2023    ALKPHO 61 05/08/2023    AST 23 05/08/2023    ALT 38 05/08/2023    BILT 0.6 05/08/2023    TP 8.2 05/08/2023    ALB 3.8 05/08/2023    GLOBULIN 4.4 05/08/2023     (L) 05/08/2023    K 4.4 05/08/2023     05/08/2023    CO2 29.0 05/08/2023     Lab Results   Component Value Date     (H) 05/08/2023    A1C 6.4 (H) 05/08/2023     Lab Results   Component Value Date    CHOLEST 171 05/08/2023    TRIG 59 05/08/2023    HDL 67 (H) 05/08/2023    LDL 92 05/08/2023    VLDL 10 05/08/2023    NONHDLC 104 05/08/2023     No results found for: \"B12\", \"VITB12\"  No results found for: \"VITD\", \"QVITD\", \"ZSFM37RQ\"    Current Outpatient Medications on File Prior to Visit   Medication Sig Dispense Refill    lisinopril-hydroCHLOROthiazide 20-25 MG Oral Tab Take 1 tablet by mouth daily. 90 tablet 1    amLODIPine 5 MG Oral Tab Take 1 tablet (5 mg total) by mouth daily. 30 tablet 0    traMADol 50 MG Oral Tab Take 1 tablet (50 mg total) by mouth every 6 (six) hours as needed for Pain. 5 tablet 1    ibuprofen 800 MG Oral Tab as needed.       No current facility-administered medications on file prior to visit.       ASSESSMENT/PLAN    ICD-10-CM    1. Therapeutic drug monitoring  Z51.81       2. Obesity (BMI 30-39.9)  E66.9       3. Hypertension, unspecified type  I10       4. Prediabetes  R73.03       5. Suspected sleep apnea  R29.818         Initial Weight Data and Goal Weight Loss:  Weight Calculations  Initial Weight: 189 lbs  Initial Weight Date: 03/13/24  Today's  Weight: 189 lbs  5% Goal: 9.45  10% Goal: 18.9  Total Weight Loss: 0 lbs  Initial consult: 189 lbs on 3/2024, Down  Lb:  lbs total     PLAN   Visualized Ekg from outpatient office today 05/2023  shows NSR  Will start medications: metformin 500mg bid for prediabetes   Is going to check insurance coverage on GLP-1 meds  --advised of side effects and adverse effects of this medication  Contradictions: none  Body composition test results given   Reviewed labs, baseline labs ordered  Continue with vitamin d OTC   HTN  stable, follows with PCP   prediabetes, reviewed last a1c 6.4% on 5/2023- will start metformin   Snoring, is awaiting insurance coverage for sleep study  Decrease carbs, increase protein, no skipping meals   Needs to incorporate exercise regimen FITTE: ACSM recommendations (150-300 minutes/ week in active weight loss)   Follow up with dietitian and psychologist as recommended.  Discussed the role of sleep and stress in weight management.  Counseled on comprehensive weight loss plan including attention to nutrition, exercise and behavior/stress management for success. See patient instruction below for more details.    Total time spent on chart review, pre-charting, obtaining history, counseling, and educating, reviewing labs was 50 minutes.       NOTE TO PATIENT: The 21st Century Cures Act makes clinical notes like these available to patients in the interest of transparency. Clinical notes are medical documents used by physicians and care providers to communicate with each other. These documents include medical language and terminology, abbreviations, and treatment information that may sound technical and at times possibly unfamiliar. In addition, at times, the verbiage may appear blunt or direct. These documents are one tool providers use to communicate relevant information and clinical opinions of the care providers in a way that allows common understanding of the clinical context.     Weight Loss Contract  reviewed and signed today 3/13/2024    There are no Patient Instructions on file for this visit.    No follow-ups on file.    Patient verbalizes understanding.    Meri Jaeger, APRN

## 2024-03-13 NOTE — PATIENT INSTRUCTIONS
Welcome to the [facility]!!  Thank you for placing your trust in our health care team, I look forward to working with you along this journey to better health!  Next steps:     1.  Schedule a personal nutrition consultation with one of our registered dieticians. Bring along your food journal (3 days minimum). See journal options below.  2.  Fill your prescribed medication and take as discussed and prescribed: metformin 500mg bid (take 1 tablet with breakfast and 1 tablet with dinner)- take with food  3. Check with insurance on coverage for GLP-1 medications: (ie. saxenda- daily, wegovy- weekly) or for the diagnosis of prediabetes or diabetes- (ie. Ozempic- weekly, trulicity- weekly, rybelsus- oral/ daily)       Please try to work on the following dietary changes this first month:  Daily protein recommendation to start:  grams  Daily carbohydrate: <110g  Daily calories: 1,300-1,400  1.  Drink water with meals and throughout the day, cut down on soda and/or juice if consumed. Consider flavored water options like Bubbly, Spindrift, Hint and Tiffany.  2.  Eat breakfast daily and focus on having protein with each meal, examples include: greek yogurt, cottage cheese, hard boiled egg, whole grain toast with peanut butter.   3.  Reduce refined carbohydrates and sugars which includes items such as sweets, as well as rice, pasta, and bread and make sure to choose whole grain options when having them with just 1 serving per meal about the size of your inner palm.  4.  Consume non starchy veggies daily working towards making them a good 50% of your daily food intake. Add them to lunch and dinner consistently.  5.  Optional can start a daily probiotic: VSL#3, (order on line at www.vsl3.com). Take 1 capsule daily with water for 30 days, then reduce to 1 every other day (this will reduce the cost). Capsules can be left out for 2 weeks, but then must be refrigerated.      Please download priti My  Fitness Pal, LoseIt! Or Net Diary to monitor daily dietary intake and you will be able to see if you are eating the right amount of calories or too much or too little which would hinder weight loss. Additionally this will help to see your daily carbohydrate and protein intake. When you set the aroldo up choose 1-2 lbs/week as a goal.  Keeping a paper food journal is an option as well to remain accountable for your choices- this is the start to mindful eating! A low calorie diet has been consistently shown to support weight loss.     Continue or start exercising to help establish a routine. If not already exercising begin with 1 day and progress as able with long-term goal of 30 minutes 5 days a week at a minimum.     Meditation daily can help manage and control stress. Chronic stress can make weight loss difficult.  Exercising is one way to help with stress, but meditation using the CALM Aroldo or another comparable alternative can be done in your home or place of work with little time commitment. This Aroldo can also help work on behavior change and improve sleep. Check out the segment under Calm Masterclass and listen to The 4 Pillars of Health. A great way to begin learning about the foundation of lifestyle with practical tips to use in your every day.     Check out www.yourweightmatters.org blog for continued daily support and education along this weight loss journey!    Patient Resources:     Personal Training/Fitness Classes/Health Coaching     [facility] @ [email or web address] Full fitness center with group fitness and personal training. Discount available as client of [facility].  Health Coaching and Personal Training with Shannon Alonso at our [facility]- individual weekly coaching with option to add personal training and small group fitness classes targeted at weight loss- [phone] and/or email @  [email or web address]  [facility] [email or web address] Group Fitness [phone] and/or email Julisa at [email or web address]  [facility] Fitness Centers with multiple locations: OfficialVirtualDJ (www.Advanced Cell Technology), TopRealty The Celladon (www.[email or web address].Booxmedia), [employer] Technologies (www.Unbounce), The Exercise  (www.exercisecoachNSC)     Online Fitness  Fitness  on Unfold  Fit in 10 DVD series- www.[email or web address]  Sit and Be Fit - Chair exercise series Www.[email or web address].[employer]  Hip Hop Fit with Hong Chin at www.hiphopfit.net     Apps for on the Go Fitness  Aerovance 7 Minute Workout (orange box with white 7) - free on the go HIIT training aroldo  Peloton Aroldo @ www.onepeloton.com     Nutrition Trackers and Tools  LoseIT! And My Fitness Pal apps and on line for tracking nutrition  NOOM - virtual health coaching  FitFoundation (healthy meals on the go) in [place] @ www.[email or web address]  Bistro MD @ wwwinfirst Healthcarebistromd.com and Xxtbyj24 (keto and low carb plans recommended) @ www.lidamu69.com, Metabolic Meals @ www.MyMetabolicMeals.com - individual prepared meals to go  Gobble, Blue Apron, Home , Every Plate, Sunbasket- on line meal delivery programs for preparation at home  Meal Village in [place] for homemade meals to go @ www.mealvillage.com  Diet Doctor @ www.dietdoctor.com - low carb swaps  YummFive minutes - meal prep and planning aroldo (www.yummly.com)     Stress Management/Behavior/Mindful Eating  CALM meditation aroldo (www.calm.com)  Headspace  Am I Hungry? Mindful eating virtual  aroldo  Www.yourweightmatters.org - Obesity Action Coalition sponsored Blog posts daily  Motivation aroldo (black box with white \")- daily supportive messages sent to your phone     Books/Video Education/Podcasts  Mindless Eating by Brock Weaver  Why We Get Sick by Homero Vallejo (a book about insulin resistance)  Atomic Habits by Thor Dc (a book about taking small steps to promote greater  behavior change)   Can't Hurt Me by Johny Toussaint (a book exploring the power of discipline in achieving your goals)  The End of Dieting: How to Live for Life by Dr. Wang Cotton M.D. or listen to The [employer] Podcast Episode 63: Understanding \"Nutritarian\" Eating w/Dr. Wang Cotton  Your Body in Balance: The New Science of Food, Hormones, and Health by Dr. Lokesh Velazquez  The Menopause Diet Plan by Martina Cartagena and Baylee Miller  The Complete Guide to fasting by Dr. Vela  Sugar, Salt & Fat by Ludmila Vallejo, Ph.D, R.D.  Weight Loss Surgery Will Not Treat Food Addiction by Carolina Norris Ph.D  The Game Changers- Wave Broadband Documentary on plant based nutrition  Fed Up - documentary about obesity (Free on Utube)  The Truth About Sugar - documentary on sugar (Free on Utube, https://youtu.be/9H3fjdgIC4m)  The Dr. Goldstein T5 Wellness Plan by Dr. Jarod Goldstein MD  Fitlosophy Fitspiration - journal to better health (found at [employer] in fitness aisle)  What Happened to You?- a look at the impact trauma has on behavior written by Jeremy Marinelli and Dr. Rahul Blue  Whole Again by Rolando Bailey - discovering your true self after trauma  Ishaan Hubbard talk on Wave Broadband, The Call to Courage  Podcasts: The Exam Room by the Physician's Committee, Nutrition Facts by Dr. Maxwell    We are here to support you with weight loss, but please remember that you still need your primary care provider for your routine health maintenance.

## 2024-03-13 NOTE — TELEPHONE ENCOUNTER
From: Wilson Covarrubias  To: Meri Jaeger  Sent: 3/13/2024 12:22 PM CDT  Subject: Insurance coverage approval    [place] Ms. Jaeger, I'd like to let you know that my insurance doesn't cover GLP-1 medications. However, it does cover pre-diabetes medications such as the Ozempic 0.25 or 0.5 Pen injector (ml). I would appreciate it if you could prescribe this for me.   Thank you.

## 2024-03-15 RX ORDER — METFORMIN HYDROCHLORIDE 500 MG/1
500 TABLET, EXTENDED RELEASE ORAL 2 TIMES DAILY WITH MEALS
Qty: 60 TABLET | Refills: 2 | Status: SHIPPED | OUTPATIENT
Start: 2024-03-15

## 2024-04-08 DIAGNOSIS — I10 HYPERTENSION, UNSPECIFIED TYPE: ICD-10-CM

## 2024-04-09 DIAGNOSIS — I10 HYPERTENSION, UNSPECIFIED TYPE: ICD-10-CM

## 2024-04-09 RX ORDER — AMLODIPINE BESYLATE 5 MG/1
5 TABLET ORAL DAILY
Qty: 30 TABLET | Refills: 0 | Status: SHIPPED | OUTPATIENT
Start: 2024-04-09 | End: 2024-06-06

## 2024-04-09 NOTE — TELEPHONE ENCOUNTER
30 day supply sent. Pt is due for annual.    LOV: 2/8/24 -PCP wanted pt to return to office in 3 months for physical.    PSR: pls contact pt to schedule annual.

## 2024-04-10 RX ORDER — AMLODIPINE BESYLATE 5 MG/1
5 TABLET ORAL DAILY
Qty: 90 TABLET | Refills: 0 | OUTPATIENT
Start: 2024-04-10

## 2024-06-03 ENCOUNTER — TELEPHONE (OUTPATIENT)
Dept: SLEEP CENTER | Age: 49
End: 2024-06-03

## 2024-06-05 ENCOUNTER — LAB ENCOUNTER (OUTPATIENT)
Dept: LAB | Age: 49
End: 2024-06-05
Attending: EMERGENCY MEDICINE
Payer: COMMERCIAL

## 2024-06-05 DIAGNOSIS — Z13.0 SCREENING FOR ENDOCRINE, NUTRITIONAL, METABOLIC AND IMMUNITY DISORDER: ICD-10-CM

## 2024-06-05 DIAGNOSIS — Z13.228 SCREENING FOR ENDOCRINE, NUTRITIONAL, METABOLIC AND IMMUNITY DISORDER: ICD-10-CM

## 2024-06-05 DIAGNOSIS — Z13.21 SCREENING FOR ENDOCRINE, NUTRITIONAL, METABOLIC AND IMMUNITY DISORDER: ICD-10-CM

## 2024-06-05 DIAGNOSIS — R73.03 PREDIABETES: ICD-10-CM

## 2024-06-05 DIAGNOSIS — Z13.29 SCREENING FOR ENDOCRINE, NUTRITIONAL, METABOLIC AND IMMUNITY DISORDER: ICD-10-CM

## 2024-06-05 LAB
ALBUMIN SERPL-MCNC: 4.7 G/DL (ref 3.2–4.8)
ALBUMIN/GLOB SERPL: 1.3 {RATIO} (ref 1–2)
ALP LIVER SERPL-CCNC: 69 U/L
ALT SERPL-CCNC: 64 U/L
ANION GAP SERPL CALC-SCNC: 9 MMOL/L (ref 0–18)
AST SERPL-CCNC: 34 U/L (ref ?–34)
BASOPHILS # BLD AUTO: 0.12 X10(3) UL (ref 0–0.2)
BASOPHILS NFR BLD AUTO: 1.3 %
BILIRUB SERPL-MCNC: 0.5 MG/DL (ref 0.3–1.2)
BUN BLD-MCNC: 13 MG/DL (ref 9–23)
BUN/CREAT SERPL: 16.7 (ref 10–20)
CALCIUM BLD-MCNC: 9.9 MG/DL (ref 8.7–10.4)
CHLORIDE SERPL-SCNC: 103 MMOL/L (ref 98–112)
CHOLEST SERPL-MCNC: 192 MG/DL (ref ?–200)
CO2 SERPL-SCNC: 27 MMOL/L (ref 21–32)
CREAT BLD-MCNC: 0.78 MG/DL
DEPRECATED RDW RBC AUTO: 38.7 FL (ref 35.1–46.3)
EGFRCR SERPLBLD CKD-EPI 2021: 94 ML/MIN/1.73M2 (ref 60–?)
EOSINOPHIL # BLD AUTO: 0.27 X10(3) UL (ref 0–0.7)
EOSINOPHIL NFR BLD AUTO: 3 %
ERYTHROCYTE [DISTWIDTH] IN BLOOD BY AUTOMATED COUNT: 12.6 % (ref 11–15)
EST. AVERAGE GLUCOSE BLD GHB EST-MCNC: 134 MG/DL (ref 68–126)
FASTING PATIENT LIPID ANSWER: YES
FASTING STATUS PATIENT QL REPORTED: YES
GLOBULIN PLAS-MCNC: 3.5 G/DL (ref 2–3.5)
GLUCOSE BLD-MCNC: 117 MG/DL (ref 70–99)
HBA1C MFR BLD: 6.3 % (ref ?–5.7)
HCT VFR BLD AUTO: 43.8 %
HDLC SERPL-MCNC: 58 MG/DL (ref 40–59)
HGB BLD-MCNC: 14.8 G/DL
IMM GRANULOCYTES # BLD AUTO: 0.02 X10(3) UL (ref 0–1)
IMM GRANULOCYTES NFR BLD: 0.2 %
LDLC SERPL CALC-MCNC: 121 MG/DL (ref ?–100)
LYMPHOCYTES # BLD AUTO: 2.79 X10(3) UL (ref 1–4)
LYMPHOCYTES NFR BLD AUTO: 30.7 %
MCH RBC QN AUTO: 28.6 PG (ref 26–34)
MCHC RBC AUTO-ENTMCNC: 33.8 G/DL (ref 31–37)
MCV RBC AUTO: 84.7 FL
MONOCYTES # BLD AUTO: 0.58 X10(3) UL (ref 0.1–1)
MONOCYTES NFR BLD AUTO: 6.4 %
NEUTROPHILS # BLD AUTO: 5.3 X10 (3) UL (ref 1.5–7.7)
NEUTROPHILS # BLD AUTO: 5.3 X10(3) UL (ref 1.5–7.7)
NEUTROPHILS NFR BLD AUTO: 58.4 %
NONHDLC SERPL-MCNC: 134 MG/DL (ref ?–130)
OSMOLALITY SERPL CALC.SUM OF ELEC: 289 MOSM/KG (ref 275–295)
PLATELET # BLD AUTO: 395 10(3)UL (ref 150–450)
POTASSIUM SERPL-SCNC: 4.1 MMOL/L (ref 3.5–5.1)
PROT SERPL-MCNC: 8.2 G/DL (ref 5.7–8.2)
RBC # BLD AUTO: 5.17 X10(6)UL
SODIUM SERPL-SCNC: 139 MMOL/L (ref 136–145)
TRIGL SERPL-MCNC: 71 MG/DL (ref 30–149)
TSI SER-ACNC: 3.62 MIU/ML (ref 0.55–4.78)
VLDLC SERPL CALC-MCNC: 12 MG/DL (ref 0–30)
WBC # BLD AUTO: 9.1 X10(3) UL (ref 4–11)

## 2024-06-05 PROCEDURE — 80053 COMPREHEN METABOLIC PANEL: CPT

## 2024-06-05 PROCEDURE — 83036 HEMOGLOBIN GLYCOSYLATED A1C: CPT

## 2024-06-05 PROCEDURE — 85025 COMPLETE CBC W/AUTO DIFF WBC: CPT

## 2024-06-05 PROCEDURE — 80061 LIPID PANEL: CPT

## 2024-06-05 PROCEDURE — 84443 ASSAY THYROID STIM HORMONE: CPT

## 2024-06-05 PROCEDURE — 36415 COLL VENOUS BLD VENIPUNCTURE: CPT

## 2024-06-06 ENCOUNTER — OFFICE VISIT (OUTPATIENT)
Dept: FAMILY MEDICINE CLINIC | Facility: CLINIC | Age: 49
End: 2024-06-06
Payer: COMMERCIAL

## 2024-06-06 VITALS
WEIGHT: 178.75 LBS | HEART RATE: 76 BPM | HEIGHT: 62 IN | OXYGEN SATURATION: 98 % | BODY MASS INDEX: 32.89 KG/M2 | SYSTOLIC BLOOD PRESSURE: 120 MMHG | DIASTOLIC BLOOD PRESSURE: 86 MMHG

## 2024-06-06 DIAGNOSIS — Z00.00 ENCOUNTER FOR ANNUAL PHYSICAL EXAMINATION EXCLUDING GYNECOLOGICAL EXAMINATION IN A PATIENT OLDER THAN 17 YEARS: Primary | ICD-10-CM

## 2024-06-06 DIAGNOSIS — E66.9 OBESITY (BMI 30-39.9): ICD-10-CM

## 2024-06-06 DIAGNOSIS — I10 HYPERTENSION, UNSPECIFIED TYPE: ICD-10-CM

## 2024-06-06 DIAGNOSIS — R29.818 SUSPECTED SLEEP APNEA: ICD-10-CM

## 2024-06-06 DIAGNOSIS — R73.03 PREDIABETES: ICD-10-CM

## 2024-06-06 DIAGNOSIS — Z12.11 SCREENING FOR COLON CANCER: ICD-10-CM

## 2024-06-06 PROCEDURE — 99214 OFFICE O/P EST MOD 30 MIN: CPT | Performed by: EMERGENCY MEDICINE

## 2024-06-06 PROCEDURE — 99396 PREV VISIT EST AGE 40-64: CPT | Performed by: EMERGENCY MEDICINE

## 2024-06-06 RX ORDER — METFORMIN HYDROCHLORIDE 500 MG/1
500 TABLET, EXTENDED RELEASE ORAL 2 TIMES DAILY WITH MEALS
Qty: 60 TABLET | Refills: 1 | Status: SHIPPED | OUTPATIENT
Start: 2024-06-06

## 2024-06-06 RX ORDER — AMLODIPINE BESYLATE 5 MG/1
5 TABLET ORAL DAILY
Qty: 90 TABLET | Refills: 1 | Status: SHIPPED | OUTPATIENT
Start: 2024-06-06

## 2024-06-06 NOTE — PATIENT INSTRUCTIONS
Thank you for choosing [facility]  To Do:  FOR BRIGETTESYLVIE BURCIAGA    Continue with all medications  Continue follow up with weight loss clinic  Continue with BP medications, follow up for PAP when ready\Otherwise follow up in 6 months for BP  recheck  Arrange for home sleep study  Complete stool cards for colon cancer screening      Well balanced diet recommended.    Routine exercise recommended most days during the week.  Wear sunscreen - SPF 15 or higher and reapply every 2 hours as needed.  Wear seat belts and drive safely.  Schedule regular appointments with dentist.  Schedule yearly eye exam if you wear glasses/contacts.  Yearly Flu Vaccine recommended in the fall  Tetanus, Diptheria and Pertussis vaccine should be given every 7-10 years.  Call or come in if there are concerns regarding domestic abuse, sexually transmitted diseases, alcohol/drug addiction, depression/anxiety issues, or any further concerns.        Preventing Osteoporosis: Meeting Your Calcium Needs    Your body needs calcium to build and repair bones. But it can't make calcium on its own. That's why it's important to eat calcium-rich foods. Some foods are naturally rich in calcium. Others have calcium added (fortified). It's best to get calcium from the foods you eat. But if you can't get enough, you may want to take calcium supplements. To meet your daily calcium needs, try the foods listed below.  Dairy Fish & beans Other sources      Source   Calcium (mg) per serving   Source   Calcium (mg) per serving   Source   Calcium (mg) per serving      Low-fat yogurt, plain   415 mg/8 oz.   Sardines, Atlantic, canned, with bones   351 mg/3 oz.   Oatmeal, instant, fortified   215 mg/1 cup   Nonfat milk   302 mg/1 cup   [place], sockeye, canned, with bones   239 mg/3 oz.   Tofu made with calcium sulfate   204 mg/3 oz.   Low-fat milk   297 mg/1 cup   Soybeans, fresh, boiled   131 mg/1/2 cup   Collards   179 mg/1/2 cup   Swiss cheese   272 mg/1  oz.   White beans, cooked   81 mg/1/2 cup   English muffin, whole wheat   175 mg/1 muffin   Cheddar cheese   205 mg/1 oz.   Navy beans, cooked   79 mg/1/2 cup   Kale   90 mg/1/2 cup   Ice cream strawberry   79 mg/1/2 cup           Orange, navel   56 mg/1 medium   Note: Calcium levels may vary depending on brand and size.  Daily calcium needs  14-18 years old: 1,300 mg  19-30 years old: 1,000 mg  31-50 years old: 1,000 mg  51-70 years old, women: 1,200 mg  51-70 years old, men: 1,000 mg  Pregnant or nursin-28 years old: 1,300 mg, 19-50 years old: 1,000 mg  Older than 70 (women and men): 1,200 mg   Date Last Reviewed: 10/17/2015  © 9580-3396 The [employer]. [address] [place] [address], [place] [address]. All rights reserved. This information is not intended as a substitute for professional medical care. Always follow your healthcare professional's instructions.        Prevention Guidelines, Women Ages 40 to 49  Screening tests and vaccines are an important part of managing your health. Health counseling is essential, too. Below are guidelines for these, for women ages 40 to 49. Talk with your healthcare provider to make sure you’re up-to-date on what you need.  Screening Who needs it How often   Type 2 diabetes or prediabetes All adults beginning at age 45 and adults without symptoms at any age who are overweight or obese and have 1 or more additional risk factors for diabetes At least every 3 years   Alcohol misuse All women in this age group At routine exams   Blood pressure All women in this age group Every 2 years if your blood pressure is less than 120/80 mm Hg; yearly if your systolic blood pressure is 120 to 139 mm Hg, or your diastolic blood pressure reading is 80 to 89 mm Hg   Breast cancer All women in this age group Yearly mammogram and clinical breast exam2  Each woman should make her own decision. If a woman decides to have mammograms before age 50, they should be done every 2 years.3    Cervical cancer All women in this age group, except women who have had a complete hysterectomy Pap test every 3 years or Pap test plus human papilloma virus (HPV) test every 5 years   Chlamydia Women at increased risk for infection At routine exams if you're at risk or have symptoms   Depression All women in this age group At routine exams   Gonorrhea Sexually active women at increased risk for infection At routine exams   Hepatitis C Anyone at increased risk; 1 time for those born between 1945 and 1965 At routine exams   High cholesterol or triglycerides All women ages 45 and older who are at risk for coronary artery disease; younger women, talk with your healthcare provider At least every 5 years   HIV All women At routine exams   Obesity All women in this age group At routine exams   Syphilis Women at increased risk for infection - talk with your healthcare provider At routine exams   Tuberculosis Women at increased risk for infection - talk with your healthcare provider Ask your healthcare provider   Vision All women in this age group Complete exam at age 40 and eye exams every 2 to 4 years. If you have a chronic disease, ask your healthcare provider how often you should have your eyes examined.4   Vaccine Who needs it How often   Chickenpox (varicella) All women in this age group who have no record of this infection or vaccine 2 doses; the second dose should be given at least 4 weeks after the first dose   Hepatitis A Women at increased risk for infection - talk with your healthcare provider 2 doses given 6 months apart   Hepatitis B Women at increased risk for infection - talk with your healthcare provider 3 doses over 6 months; second dose should be given 1 month after the first dose; the third dose should be given at least 2 months after the second dose and at least 4 months after the first dose   Haemophilus influenzae Type B (HIB) Women at increased risk 1 to 3 doses   Influenza (flu) All women in this  age group Once a year   Measles, mumps, rubella (MMR) All women in this age group who have no record of these infections or vaccines 1 or 2 doses   Meningococcal Women at increased risk for infection - talk with your healthcare provider 1 or more doses   Pneumococcal conjugate vaccine (PCV13) and pneumococcal polysaccharide vaccine (PPSV23) Women at increased risk for infection - talk with your healthcare provider 1 or 2 doses   Tetanus/diphtheria/pertussis (Td/Tdap) booster All women in this age group A one-time dose of Tdap instead of a Td booster after age 18, then Td every 10 years   Counseling Who needs it How often   BRCA gene mutation testing for breast and ovarian cancer susceptibility Women with increased risk for having gene mutation When your risk is known   Breast cancer and chemoprevention Women at high risk for breast cancer When your risk is known   Diet and exercise Women who are overweight or obese When diagnosed, and then at routine exams   Domestic violence Women at the age in which they are able to have children At routine exams   Sexually transmitted infection prevention Women at increased risk for infection - talk with your healthcare provider At routine exams   Use of tobacco and the health effects it can cause All women in this age group Every exam   1American Diabetes Association  2American Cancer Society  3U.S. Preventive Services Task Force  4[facility] Academy of Ophthalmology  Date Last Reviewed: 8/27/2015  © 4443-3849 [employer]. [address] [place] [address], [place] [address]. All rights reserved. This information is not intended as a substitute for professional medical care. Always follow your healthcare professional's instructions.

## 2024-06-06 NOTE — PROGRESS NOTES
Wilson Covarrubias is a 48 year old female who presents for a complete physical exam.   HPI:     Chief Complaint   Patient presents with    Well Adult     Annual Physical            Age: 48    1First day of last menstrual period (or first year of         menstruation, if through menopause 1 month ago   2Number of times pregnant: 0              Number of completed pregnancies:  0              Date of last pregnancy:      3. If you are under age 55, what method of birth control do you use? NO              Are you planning a pregnancy  in the next 6-12 months? NO               If you are through menopause or over age 50, do you take  any of the following pills?                          Calcium                          Estrogen (Premarin)                              Progesterone (Provera)     4. Have you had any of the following problems:               A.  Abnormal Pap smears  ALL nl               If yes, date:                 problem:                 For abnormality, did you have any of the following done:                    Colposcopy   NO                  Biopsies     NO                  Surgery     NO            B. High blood pressure, heart disease or high cholesterol YES on Lisinopril hydrochlorothiazide and amlodipine                    D.Abdominal or pelvic surgery or special tests    NO   5. Do you have any of the following:      Problems with present method of birth control NA   Bleeding between periods or since periods stopped NO    A new or enlarging lump in breast NO      Change in size/firmness of stools   NO   Change in size/color of a mole NO   6. Do you have a parent, brother or sister with a history of the following:                  Cancer of the breast, intestine or female organs Bone cancer in father??                Heart pain or heart attacks  before the age of 55 NO   8. Have you ever used tobacco?     NO     9. Do you drink alcohol?              NONE   10. Prevention:         b. Exercise:                               Activity:         c. Do you always wear seat belts?              YES       d. If over 30 years old, have you  had your cholesterol level checked  in the past five years? NO       e. Have you had a tetanus shot  the past 10 years? YES 2018       f. Does your house have a working smoke detector? YES        g. Do you have firearms at home?            NO        h. Have you ever had a mammogram?  NO       i.  How many sexual partners have you  had in the last 12 months?              In your lifetime?       j. When is the last time you had           a dental check-up?  1 year ago         11. Please describe any concerns you have:                          HYPERTENSION  On lisinopril-hydrochlorothiazide and Amlodipine  Feels well has been compliant with all medication.  Denies any chest pain or shortness of breath.    No dizziness or syncope  No swelling  no CP no SOB  Has lost weight since last office visit and has followed up with weight loss clinic    Admits to heavy breathing when sleeping  + unrefreshing sleep  Questionable daytime somnolence    Patient also requests work note for accommodation regarding her wrist.  Patient with known history of bilateral carpal tunnel with a history of carpal tunnel release on the right.  Still having symptoms on the left.  Also occasionally experiences some numbness and tingling on the right which carpal tunnel release was performed on in 2021    Wt Readings from Last 6 Encounters:   06/06/24 178 lb 12 oz (81.1 kg)   03/13/24 189 lb (85.7 kg)   02/08/24 195 lb (88.5 kg)   05/19/23 179 lb (81.2 kg)   05/15/23 178 lb (80.7 kg)   03/20/23 177 lb (80.3 kg)         Wt Readings from Last 3 Encounters:   06/06/24 178 lb 12 oz (81.1 kg)   03/13/24 189 lb (85.7 kg)   02/08/24 195 lb (88.5 kg)        BP Readings from Last 3 Encounters:   06/06/24 120/86   03/13/24 122/74   02/08/24 136/80         Patient's last menstrual period was 06/03/2024 (approximate).        Current Outpatient Medications   Medication Sig Dispense Refill    amLODIPine 5 MG Oral Tab Take 1 tablet (5 mg total) by mouth daily. 90 tablet 1    metFORMIN  MG Oral Tablet 24 Hr Take 1 tablet (500 mg total) by mouth 2 (two) times daily with meals. 60 tablet 1    lisinopril-hydroCHLOROthiazide 20-25 MG Oral Tab Take 1 tablet by mouth daily. 90 tablet 1    traMADol 50 MG Oral Tab Take 1 tablet (50 mg total) by mouth every 6 (six) hours as needed for Pain. 5 tablet 1    ibuprofen 800 MG Oral Tab as needed.        Past Medical History:    Essential hypertension    High blood pressure    Obesity    Visual impairment    reading glasses      Past Surgical History:   Procedure Laterality Date    Carpal tunnel release Right 05/12/2023      Family History   Problem Relation Age of Onset    Hypertension Mother     Diabetes Father       Social History     Socioeconomic History    Marital status:    Tobacco Use    Smoking status: Never    Smokeless tobacco: Never   Vaping Use    Vaping status: Never Used   Substance and Sexual Activity    Alcohol use: Never    Drug use: Never   Other Topics Concern    Caffeine Concern No    Exercise No    Seat Belt No    Special Diet No    Stress Concern No    Weight Concern No        Current Outpatient Medications   Medication Sig Dispense Refill    amLODIPine 5 MG Oral Tab Take 1 tablet (5 mg total) by mouth daily. 90 tablet 1    metFORMIN  MG Oral Tablet 24 Hr Take 1 tablet (500 mg total) by mouth 2 (two) times daily with meals. 60 tablet 1    lisinopril-hydroCHLOROthiazide 20-25 MG Oral Tab Take 1 tablet by mouth daily. 90 tablet 1    traMADol 50 MG Oral Tab Take 1 tablet (50 mg total) by mouth every 6 (six) hours as needed for Pain. 5 tablet 1    ibuprofen 800 MG Oral Tab as needed.        Past Medical History:    Essential hypertension    High blood pressure    Obesity    Visual impairment    reading glasses      Past Surgical History:   Procedure Laterality  Date    Carpal tunnel release Right 05/12/2023      Family History   Problem Relation Age of Onset    Hypertension Mother     Diabetes Father       Social History:   Social History     Socioeconomic History    Marital status:    Tobacco Use    Smoking status: Never    Smokeless tobacco: Never   Vaping Use    Vaping status: Never Used   Substance and Sexual Activity    Alcohol use: Never    Drug use: Never   Other Topics Concern    Caffeine Concern No    Exercise No    Seat Belt No    Special Diet No    Stress Concern No    Weight Concern No            REVIEW OF SYSTEMS:   GENERAL HEALTH: feels well, no fatigue.  SKIN: denies any unusual skin lesions or rashes  EYES: no visual complaints or deficits  HEENT: denies nasal congestion, sinus pain or sore throat; hearing loss negative,   RESPIRATORY: denies shortness of breath, wheezing or cough   CARDIOVASCULAR: denies chest pain, SOB, edema,orthopnea, no palpitations   GI: denies nausea, vomiting, constipation, diarrhea; no rectal bleeding; no heartburn  GENITAL/: no dysuria, urgency or frequency  MUSCULOSKELETAL: no joint complaints upper or lower extremities  NEURO: no sensory or motor complaint  HEMATOLOGY: denies hx anemia; denies bruising or excessive bleeding  ENDOCRINE: denies excessive thirst or urination; denies unexpected wt gain or wt loss  ALLERGY/IMM.: denies food or seasonal allergies  PSYCH: no symptoms of depression or anxiety, depression screening negative.      EXAM:   /86   Pulse 76   Ht 5' 2\" (1.575 m)   Wt 178 lb 12 oz (81.1 kg)   LMP 06/03/2024 (Approximate)   SpO2 98%   BMI 32.69 kg/m²      General: WD/WN in no acute distress.   HEENT: PERRLA and EOMI.  OP moist no lesions.TM WNL, zeyad.Normal ears canals bilaterally.  Neck is supple, with no cervical LAD or thyroid abnormalities. No carotid bruits.    Lungs: are clear to auscultation bilaterally, with no wheeze, rhonchi, or rales.   Heart: is RRR.  S1, S2, with no  murmurs,clicks, gallops  Abdomen: is soft,NBS, NT/ND with no HSM.  No rebound or guarding. No CVA tenderness, no hernias.   exam: deferred  Neuro: Cranial nerves II-XII normal,no focal abnormalities, and reflexes coordination and gait normal and symmetric.Sensation intact.  Extremities: are symmetric with no cyanosis, clubbing, or edema.  MS: Normal muscles tones, no joints abnormalities.  SKIN: Normal color, turgor, no lesions, rashes or wounds.  PSYCH: normal affect and mood.      ASSESSMENT AND PLAN:       1. Encounter for annual physical examination excluding gynecological examination in a patient older than 17 years    2. Hypertension, unspecified type  - amLODIPine 5 MG Oral Tab; Take 1 tablet (5 mg total) by mouth daily.  Dispense: 90 tablet; Refill: 1    Stable on amlodipine lisinopril hydrochlorothiazide    3. Prediabetes  - metFORMIN  MG Oral Tablet 24 Hr; Take 1 tablet (500 mg total) by mouth 2 (two) times daily with meals.  Dispense: 60 tablet; Refill: 1    Emphasize importance of continued weight loss.  Continue follow-up with weight loss clinic.      4. Screening for colon cancer  - Occult Blood, Fecal, FIT Immunoassay; Future    5. Suspected sleep apnea  Will order home sleep study  Clinic sleep study not covered    6. Obesity (BMI 30-39.9)  Continue with metformin continue follow-up with weight loss clinic      Wilson Covarrubias is a 48 year old female who presents for a complete physical exam.Gyn exam and Pap smear due, pt agrees to follow up for pap  Self breast exams advised.  The patient should schedule annual mammograms beginning at the age of 40.    Counseled on fat diet and aerobic exercise 30 minutes three times weekly.   Counseled on maintaining a healthy weight and healthy BMI  Health maintenance.   Immunizations reviewed and updated  TDAP UTD  The patient indicates understanding of these issues and agrees to the plan.  The patient is asked  to return yearly for annual preventative  health exam.      Well balanced diet recommended.    Routine exercise recommended most days during the week.  Wear sunscreen - SPF 15 or higher and reapply every 2 hours as needed.  Wear seat belts and drive safely.  Schedule regular appointments with dentist.  Schedule yearly eye exam if you wear glasses/contacts.  Yearly Flu Vaccine recommended.  Tetanus, Diptheria and Pertussis vaccine should be given every 7-10 years.  Call or come in if there are concerns regarding domestic abuse, sexually transmitted diseases, alcohol/drug addiction, depression/anxiety issues, or any further concerns.    PATIENT INSTRUCTIONS:    Continue with all medications  Continue follow up with weight loss clinic  Continue with BP medications, follow up for PAP when ready\Otherwise follow up in 6 months for BP  recheck  Arrange for home sleep study  Complete stool cards for colon cancer screening      FOLLOW UP:  6 months

## 2024-07-22 ENCOUNTER — OFFICE VISIT (OUTPATIENT)
Dept: INTERNAL MEDICINE CLINIC | Facility: CLINIC | Age: 49
End: 2024-07-22
Payer: COMMERCIAL

## 2024-07-22 ENCOUNTER — TELEPHONE (OUTPATIENT)
Dept: INTERNAL MEDICINE CLINIC | Facility: CLINIC | Age: 49
End: 2024-07-22

## 2024-07-22 VITALS
SYSTOLIC BLOOD PRESSURE: 110 MMHG | DIASTOLIC BLOOD PRESSURE: 60 MMHG | WEIGHT: 181 LBS | RESPIRATION RATE: 16 BRPM | HEART RATE: 82 BPM | HEIGHT: 62 IN | BODY MASS INDEX: 33.31 KG/M2

## 2024-07-22 DIAGNOSIS — E11.9 TYPE 2 DIABETES MELLITUS WITHOUT COMPLICATION, WITHOUT LONG-TERM CURRENT USE OF INSULIN (HCC): ICD-10-CM

## 2024-07-22 DIAGNOSIS — E66.9 OBESITY (BMI 30-39.9): ICD-10-CM

## 2024-07-22 DIAGNOSIS — R29.818 SUSPECTED SLEEP APNEA: ICD-10-CM

## 2024-07-22 DIAGNOSIS — I10 HYPERTENSION, UNSPECIFIED TYPE: ICD-10-CM

## 2024-07-22 DIAGNOSIS — Z51.81 THERAPEUTIC DRUG MONITORING: Primary | ICD-10-CM

## 2024-07-22 PROCEDURE — 99214 OFFICE O/P EST MOD 30 MIN: CPT | Performed by: NURSE PRACTITIONER

## 2024-07-22 RX ORDER — SEMAGLUTIDE 0.68 MG/ML
0.25 INJECTION, SOLUTION SUBCUTANEOUS WEEKLY
Qty: 3 ML | Refills: 1 | Status: SHIPPED | OUTPATIENT
Start: 2024-07-22

## 2024-07-22 RX ORDER — METFORMIN HYDROCHLORIDE 500 MG/1
500 TABLET, EXTENDED RELEASE ORAL 2 TIMES DAILY WITH MEALS
Qty: 60 TABLET | Refills: 2 | Status: SHIPPED | OUTPATIENT
Start: 2024-07-22

## 2024-07-22 NOTE — PATIENT INSTRUCTIONS
Next steps:  1.  Fill your prescribed medication and take as discussed and prescribed: metformin 500mg (2 times per day)  Will trial ozempic 0.25mg weekly x4 weeks and increase to 0.5mg weekly x 4 weeks    2.  Schedule a personal nutrition consultation with one of our registered dieticians     Please try to work on the following dietary changes:  Daily protein recommendation to start:  grams  Daily carbohydrate: <110g  Daily calories: 1,300-1,400  1.  Drink water with meals and throughout the day, cut down on soda and/or juice if consumed. Consider flavored water options like Bubbly, Spindrift, Hint and Tiffany.  2.  Eat breakfast daily and focus on having protein with each meal, examples include: greek yogurt, cottage cheese, hard boiled egg, whole grain toast with peanut butter.   3.  Reduce refined carbohydrates and sugars which includes items such as sweets, as well as rice, pasta, and bread and make sure to choose whole grain options when having them with just 1 serving per meal about the size of your inner palm.  4.  Consume non starchy veggies daily working towards making them a good 50% of your daily food intake. Add them to lunch and dinner consistently.  5.  Start a daily probiotic: VSL#3 is recommended, (order on line at www.vsl3.com). Take 1 capsule daily with water for 30 days, then reduce to 1 every other day (this will reduce the cost). Capsules can be left out for 2 weeks, but then must be refrigerated.      Please download priti My Fitness Pal, LoseIt! Or Net Diary to monitor daily dietary intake and you will be able to see if you are eating the right amount of calories or too much or too little which would hinder weight loss. Additionally this will help to see your daily carbohydrate and protein intake. When you set the priti up choose 1-2 lbs/week as a goal.  Keeping a paper food journal is an option as well to remain accountable for your choices- this is the start to mindful eating! A low  calorie diet has been consistently shown to support weight loss.     Continue or start exercising to help establish a routine. If not already exercising begin with 1 day and progress as able with long-term goal of 30 minutes 5 days a week at a minimum.     Meditation daily can help manage and control stress. Chronic stress can make weight loss difficult.  Exercising is one way to help with stress, but meditation using the CALM Aroldo or another comparable alternative can be done in your home or place of work with little time commitment. This Aroldo can also help work on behavior change and improve sleep. Check out the segment under Calm Masterclass and listen to The 4 Pillars of Health. A great way to begin learning about the foundation of lifestyle with practical tips to use in your every day.     Check out www.yourweightmatters.org blog for continued daily support and education along this weight loss journey!    Patient Resources:     Personal Training/Fitness Classes/Health Coaching     [facility] @ [email or web address] Full fitness center with group fitness and personal training. Discount available as client of [facility].  Health Coaching and Personal Training with Shannon Alonso at our [facility]- individual weekly coaching with option to add personal training and small group fitness classes targeted at weight loss- [phone] and/or email @ [email or web address]  [email or web address] [place] [email or web address] Group Fitness [phone] and/or email Julisa at [email or web address]  Franc\[facility] [place] [facility]\""ed Fitness Centers with multiple locations: Next Generation Dance (www.MDJunction), Eat The Frog Fitness (www."S[employer]".StepLeader), Fit Body Bootcamp (www.Second LightbodybootStraatum Processwarep.StepLeader), "Mind Pirate[employer] Inc." Fitness (www.Gigle Networks), The Exercise  (www.exercisecoach.StepLeader)     Online Fitness  Fitness  on Utube  Fit  in 10 DVD series- www.rmfeb64FEB.com  Sit and Be Fit - Chair exercise series Www.sitandbefit.org  Hip Hop Fit with Hong Chin at www.hiphopfit.net     Apps for on the Go Fitness  [place] 7 Minute Workout (orange box with white 7) - free on the go HIIT training aroldo  Peloton Aroldo @ www.onepeloton.com     Nutrition Trackers and Tools  LoseIT! And My Fitness Pal apps and on line for tracking nutrition  NOOM - virtual health coaching  FitFoundation (healthy meals on the go) in [place] @ www.sslvfmitqqnbc4z.Peanut Labs  Bistro MD @ www.bistromd.com and Ktqstg76 (keto and low carb plans recommended) @ wwwGourmantidxeyq65.com, Metabolic Meals @ www.CouponCabinMetabolicMeals.com - individual prepared meals to go  Gobble, Blue Apron, Home , Every Plate, Sunbasket- on line meal delivery programs for preparation at home  Meal Village in [place] for homemade meals to go @ www.mealvillage.Peanut Labs  Diet Doctor @ www.dietdoctor.com - low carb swaps  Yummly - meal prep and planning aroldo (www.yummly.com)     Stress Management/Behavior/Mindful Eating  CALM meditation aroldo (www.calm.com)  Headspace  Am I Hungry? Mindful eating virtual  aroldo  Www.yourweightmatters.org - Obesity Action Coalition sponsored Blog posts daily  Motivation aroldo (black box with white \")- daily supportive messages sent to your phone     Books/Video Education/Podcasts  Mindless Eating by Brock Weaver  Why We Get Sick by Homero Vallejo (a book about insulin resistance)  Atomic Habits by Thor Dc (a book about taking small steps to promote greater behavior change)   Can't Hurt Me by Johny Toussaint (a book exploring the power of discipline in achieving your goals)  The End of Dieting: How to Live for Life by Dr. Wang Cotton M.D. or listen to The Immure Records Podcast Episode 63: Understanding \"Nutritarian\" Eating w/Dr. Wang Cotton  Your Body in Balance: The New Science of Food, Hormones, and Health by Dr. Lokesh Velazquez  The Menopause Diet Plan by Martina Cartagena and Baylee  Paul  The Complete Guide to fasting by Dr. Vela  Sugar, Salt & Fat by Ludmila Vallejo, Ph.D, R.D.  Weight Loss Surgery Will Not Treat Food Addiction by Carolina Norris Ph.D  The Game Changers- Netflix Documentary on plant based nutrition  Fed Up - documentary about obesity (Free on Utube)  The Truth About Sugar - documentary on sugar (Free on Utube, https://youtu.be/3C9ktfrFV7o)  The Dr. Goldstein T5 Wellness Plan by Dr. Jarod Goldstein MD  Fitlosophy Fitspiration - journal to better health (found at [employer] in fitness aisle)  What Happened to You?- a look at the impact trauma has on behavior written by Jeremy Marinelli and Dr. Rahul Blue  Whole Again by Rolando Bailey - discovering your true self after trauma  Ishaan Hubbard talk on Netflix, The Call to Courage  Podcasts: The Exam Room by the Physician's Committee, Nutrition Facts by Dr. Maxwell    We are here to support you with weight loss, but please remember that you still need your primary care provider for your routine health maintenance.

## 2024-07-22 NOTE — TELEPHONE ENCOUNTER
Approved     Prior authorization approved  Payer: [employer] Case ID: [id number]    [phone]    [phone]  Note from payer: CaseId:[id number];Status:Approved;Review Type:Prior Auth;Coverage Start Date:06/22/2024;Coverage End Date:07/22/2025;  Approval Details     Authorized from June 22, 2024 to July 22, 2025  Electronic appeal: Not supported  View History  Medication Being Authorized     semaglutide (OZEMPIC, 0.25 OR 0.5 MG/DOSE,) 2 MG/3ML Subcutaneous Solution Pen-injector  Inject 0.25 mg into the skin once a week.  Dispense: 3 mL Refills: 1   Start: 7/22/2024   Class: Normal Diagnoses: Therapeutic drug monitoring; Obesity (BMI 30-39.9); Type 2 diabetes mellitus without complication, without long-term current use of insulin (HCC)   This order has been released to its destination.  To be filled at: [id number] [address] AT [place] [address], [phone], [phone]

## 2024-07-22 NOTE — TELEPHONE ENCOUNTER
Approved    Prior authorization approved  Payer: [employer] Case ID: [id number]    [phone]    [phone]  Note from payer: CaseId:[id number];Status:Approved;Review Type:Prior Auth;Coverage Start Date:06/22/2024;Coverage End Date:07/22/2025;  Approval Details    Authorized from June 22, 2024 to July 22, 2025  Electronic appeal: Not supported  View History  Medication Being Authorized    semaglutide (OZEMPIC, 0.25 OR 0.5 MG/DOSE,) 2 MG/3ML Subcutaneous Solution Pen-injector  Inject 0.25 mg into the skin once a week.  Dispense: 3 mL Refills: 1   Start: 7/22/2024   Class: Normal Diagnoses: Therapeutic drug monitoring; Obesity (BMI 30-39.9); Type 2 diabetes mellitus without complication, without long-term current use of insulin (HCC)   This order has been released to its destination.  To be filled at: [id number] [facility] [address] AT [place] [address], [phone], [phone]

## 2024-07-22 NOTE — PROGRESS NOTES
HISTORY OF PRESENT ILLNESS  Chief Complaint   Patient presents with    Weight Check     -8     Wilson Covarrubias is a 48 year old female here for follow up with medical weight loss program for the treatment of overweight, obesity, or morbid obesity.     Down 8 lbs (first month f/u from 3/2024)  Compliant on metformin 500mg bid   Tolerating well, helping with decreasing appetite and no side effects     Success: loose #10 lbs  Challenging: taking more time in cardio exercise   Saw PCP and discussed adding diagnosis of type 2 diabetes (since she didn't want to add at first and they both agreed to add it to her chart)   Exercise/Activity: 6x/ week, via walk, not doing anything routine as far as exercise  Nutrition: eating regular meals, +protein, minimal veggies. not tracking reports  Meals out per week on average: 1  Stress is manageable   Sleep: 6 hours/night, waking up feeling rested    Denies chest pain, shortness of breath, dizziness, blurred vision, headache, paresthesia, nausea/vomiting.     Breakfast Lunch Dinner Snacks Fluids   Reviewed              Wt Readings from Last 6 Encounters:   07/22/24 181 lb (82.1 kg)   06/06/24 178 lb 12 oz (81.1 kg)   03/13/24 189 lb (85.7 kg)   02/08/24 195 lb (88.5 kg)   05/19/23 179 lb (81.2 kg)   05/15/23 178 lb (80.7 kg)          REVIEW OF SYSTEMS  GENERAL: feels well otherwise, denied any fevers chills or night sweats   LUNGS: denies shortness of breath  CARDIOVASCULAR: denies chest pain  GI: denies abdominal pain  MUSCULOSKELETAL: denies back pain, joint pains   PSYCH: denies change in behavior or mood, denies feeling sad or depressed    EXAM  /60   Pulse 82   Resp 16   Ht 5' 2\" (1.575 m)   Wt 181 lb (82.1 kg)   LMP 07/08/2024 (Exact Date)   BMI 33.11 kg/m²       GENERAL: well developed, well nourished, in no apparent distress, A/O x3  SKIN: no rashes, no suspicious lesions  HEENT: atraumatic, normocephalic, OP-clear, PERRLA  NECK: supple, no adenopathy  LUNGS:  CTA in all fields, breathing non labored  CARDIO: RRR without murmur  GI: +BS, NT/ND, no masses or HSM  EXTREMITIES: no cyanosis, no clubbing, no edema    Lab Results   Component Value Date     (H) 06/05/2024    BUN 13 06/05/2024    BUNCREA 16.7 06/05/2024    CREATSERUM 0.78 06/05/2024    ANIONGAP 9 06/05/2024    GFRNAA 69 05/18/2022    GFRAA 79 05/18/2022    CA 9.9 06/05/2024    OSMOCALC 289 06/05/2024    ALKPHO 69 06/05/2024    AST 34 06/05/2024    ALT 64 (H) 06/05/2024    BILT 0.5 06/05/2024    TP 8.2 06/05/2024    ALB 4.7 06/05/2024    GLOBULIN 3.5 06/05/2024     06/05/2024    K 4.1 06/05/2024     06/05/2024    CO2 27.0 06/05/2024     Lab Results   Component Value Date     (H) 06/05/2024    A1C 6.3 (H) 06/05/2024     Lab Results   Component Value Date    CHOLEST 192 06/05/2024    TRIG 71 06/05/2024    HDL 58 06/05/2024     (H) 06/05/2024    VLDL 12 06/05/2024    NONHDLC 134 (H) 06/05/2024     No results found for: \"B12\", \"VITB12\"  No results found for: \"VITD\", \"QVITD\", \"YNBD46OX\"    Current Outpatient Medications on File Prior to Visit   Medication Sig Dispense Refill    amLODIPine 5 MG Oral Tab Take 1 tablet (5 mg total) by mouth daily. 90 tablet 1    metFORMIN  MG Oral Tablet 24 Hr Take 1 tablet (500 mg total) by mouth 2 (two) times daily with meals. 60 tablet 1    lisinopril-hydroCHLOROthiazide 20-25 MG Oral Tab Take 1 tablet by mouth daily. 90 tablet 1    ibuprofen 800 MG Oral Tab as needed.       No current facility-administered medications on file prior to visit.       ASSESSMENT/PLAN    ICD-10-CM    1. Therapeutic drug monitoring  Z51.81       2. Obesity (BMI 30-39.9)  E66.9       3. Prediabetes  R73.03       4. Hypertension, unspecified type  I10           PLAN   Initial Weight Data and Goal Weight Loss:  Initial consult: #189 lbs on 3/2024  Weight Calculations  Initial Weight: 189 lbs  Initial Weight Date: 03/13/24  Today's Weight: 181 lbs  5% Goal: 9.45  10% Goal:  18.9  Total Weight Loss: 8 lbs  Total weight loss: Down 8 lbs total, Net loss 8 lbs  Continue with medications: metformin 500mg bid for prediabetes   Will trial Will trial ozempic 0.25mg weekly X 4 weeks (demo) and increase to 0.5mg weekly x 4 weeks-  denies any personal or family history of pancreatitis, pancreatic cancer, thyroid cancer, MEN2   --advised of side effects and adverse effects of this medication  Contradictions: none  Reviewed labs  Continue with vitamin d OTC   HTN  stable, follows with PCP   Type 2 diabetes, reviewed last a1c 6.3% on 6/2024- was previously 6.4% on 5/2023- will continue with metformin and add in ozempic   Snoring, is awaiting insurance coverage for sleep study  Wrote out macros and encouraged tracking   Nutrition: Low carb diet, recommended to eat breakfast daily/ regular protein intake  Follow up with dietitian and psychologist as recommended.  Discussed the role of sleep and stress in weight management.  Counseled on comprehensive weight loss plan including attention to nutrition, exercise and behavior/stress management for success. See patient instruction below for more details.  Discussed strategies to overcome barriers to successful weight loss and weight maintenance  FITTE: ACSM recommendations (150-300 minutes/ week in active weight loss)   Weight Loss Consent to treat reviewed and signed.    Total time spent on chart review, pre-charting, obtaining history, counseling, and educating, reviewing labs was 30 minutes.       NOTE TO PATIENT: The 21st Century Cures Act makes clinical notes like these available to patients in the interest of transparency. Clinical notes are medical documents used by physicians and care providers to communicate with each other. These documents include medical language and terminology, abbreviations, and treatment information that may sound technical and at times possibly unfamiliar. In addition, at times, the verbiage may appear blunt or direct.  These documents are one tool providers use to communicate relevant information and clinical opinions of the care providers in a way that allows common understanding of the clinical context.     There are no Patient Instructions on file for this visit.    No follow-ups on file.    Patient verbalizes understanding.    Meri Jaeger, APRN

## 2024-08-05 DIAGNOSIS — I10 HYPERTENSION, UNSPECIFIED TYPE: ICD-10-CM

## 2024-08-05 RX ORDER — AMLODIPINE BESYLATE 5 MG/1
5 TABLET ORAL DAILY
Qty: 30 TABLET | Refills: 0 | OUTPATIENT
Start: 2024-08-05

## 2024-09-05 DIAGNOSIS — I10 HYPERTENSION, UNSPECIFIED TYPE: ICD-10-CM

## 2024-09-06 RX ORDER — LISINOPRIL AND HYDROCHLOROTHIAZIDE 20; 25 MG/1; MG/1
1 TABLET ORAL DAILY
Qty: 90 TABLET | Refills: 1 | Status: SHIPPED | OUTPATIENT
Start: 2024-09-06

## 2024-09-19 DIAGNOSIS — Z51.81 THERAPEUTIC DRUG MONITORING: ICD-10-CM

## 2024-09-19 DIAGNOSIS — E66.9 OBESITY (BMI 30-39.9): ICD-10-CM

## 2024-09-19 DIAGNOSIS — E11.9 TYPE 2 DIABETES MELLITUS WITHOUT COMPLICATION, WITHOUT LONG-TERM CURRENT USE OF INSULIN (HCC): ICD-10-CM

## 2024-09-19 RX ORDER — SEMAGLUTIDE 0.68 MG/ML
0.25 INJECTION, SOLUTION SUBCUTANEOUS WEEKLY
Qty: 3 ML | Refills: 1 | OUTPATIENT
Start: 2024-09-19

## 2024-09-19 NOTE — TELEPHONE ENCOUNTER
Requesting   Requested Prescriptions     Pending Prescriptions Disp Refills    OZEMPIC, 0.25 OR 0.5 MG/DOSE, 2 MG/3ML Subcutaneous Solution Pen-injector [Pharmacy Med Name: OZEMPIC 0.25 OR 0.5MG DOS(2MG/3ML)] 3 mL 1     Sig: INJECT 0.25 MG INTO THE SKIN ONCE A WEEK       LOV: 07/22/2024  RTC: in about 3 months  Filled: 07/22/2024 #3ml with 1 refills    Future Appointments   Date Time Provider Department Center   10/2/2024  1:40 PM Meri Jaeger APRN [facility]   10/15/2024  7:00 PM SCHEDULE BY DATE [facility]   1/22/2025 10:00 AM Meri Jaeger APRN [facility]     Waiting for pt

## 2024-10-02 ENCOUNTER — ORDER TRANSCRIPTION (OUTPATIENT)
Dept: ADMINISTRATIVE | Facility: HOSPITAL | Age: 49
End: 2024-10-02

## 2024-10-02 ENCOUNTER — OFFICE VISIT (OUTPATIENT)
Dept: INTERNAL MEDICINE CLINIC | Facility: CLINIC | Age: 49
End: 2024-10-02
Payer: COMMERCIAL

## 2024-10-02 VITALS
RESPIRATION RATE: 16 BRPM | WEIGHT: 168 LBS | BODY MASS INDEX: 30.91 KG/M2 | SYSTOLIC BLOOD PRESSURE: 112 MMHG | HEART RATE: 88 BPM | HEIGHT: 62 IN | DIASTOLIC BLOOD PRESSURE: 78 MMHG

## 2024-10-02 DIAGNOSIS — R73.03 PREDIABETES: ICD-10-CM

## 2024-10-02 DIAGNOSIS — Z12.31 ENCOUNTER FOR SCREENING MAMMOGRAM FOR MALIGNANT NEOPLASM OF BREAST: Primary | ICD-10-CM

## 2024-10-02 DIAGNOSIS — E11.9 TYPE 2 DIABETES MELLITUS WITHOUT COMPLICATION, WITHOUT LONG-TERM CURRENT USE OF INSULIN (HCC): ICD-10-CM

## 2024-10-02 DIAGNOSIS — Z51.81 THERAPEUTIC DRUG MONITORING: Primary | ICD-10-CM

## 2024-10-02 DIAGNOSIS — E66.9 OBESITY (BMI 30-39.9): ICD-10-CM

## 2024-10-02 DIAGNOSIS — I10 HYPERTENSION, UNSPECIFIED TYPE: ICD-10-CM

## 2024-10-02 DIAGNOSIS — R29.818 SUSPECTED SLEEP APNEA: ICD-10-CM

## 2024-10-02 PROCEDURE — 99214 OFFICE O/P EST MOD 30 MIN: CPT | Performed by: NURSE PRACTITIONER

## 2024-10-02 RX ORDER — METFORMIN HCL 500 MG
500 TABLET, EXTENDED RELEASE 24 HR ORAL 2 TIMES DAILY WITH MEALS
Qty: 180 TABLET | Refills: 1 | Status: SHIPPED | OUTPATIENT
Start: 2024-10-02

## 2024-10-02 NOTE — PATIENT INSTRUCTIONS
Next steps:  1.  Fill your prescribed medication and take as discussed and prescribed: ozempic 1mg weekly or metformin 500mg bid  2.  Schedule a personal nutrition consultation with one of our registered dieticians     Please try to work on the following dietary changes:  Daily protein recommendation to start:  grams  Daily carbohydrate: <115g  Daily calories: 1,300-1,400  1.  Drink water with meals and throughout the day, cut down on soda and/or juice if consumed. Consider flavored water options like Bubbly, Spindrift, Hint and Tiffany.  2.  Eat breakfast daily and focus on having protein with each meal, examples include: greek yogurt, cottage cheese, hard boiled egg, whole grain toast with peanut butter.   3.  Reduce refined carbohydrates and sugars which includes items such as sweets, as well as rice, pasta, and bread and make sure to choose whole grain options when having them with just 1 serving per meal about the size of your inner palm.  4.  Consume non starchy veggies daily working towards making them a good 50% of your daily food intake. Add them to lunch and dinner consistently.  5.  Start a daily probiotic: VSL#3 is recommended, (order on line at www.vsl3.com). Take 1 capsule daily with water for 30 days, then reduce to 1 every other day (this will reduce the cost). Capsules can be left out for 2 weeks, but then must be refrigerated.      Please download priti My Fitness Pal, LoseIt! Or Net Diary to monitor daily dietary intake and you will be able to see if you are eating the right amount of calories or too much or too little which would hinder weight loss. Additionally this will help to see your daily carbohydrate and protein intake. When you set the priti up choose 1-2 lbs/week as a goal.  Keeping a paper food journal is an option as well to remain accountable for your choices- this is the start to mindful eating! A low calorie diet has been consistently shown to support weight loss.     Continue or  start exercising to help establish a routine. If not already exercising begin with 1 day and progress as able with long-term goal of 30 minutes 5 days a week at a minimum.     Meditation daily can help manage and control stress. Chronic stress can make weight loss difficult.  Exercising is one way to help with stress, but meditation using the CALM Aroldo or another comparable alternative can be done in your home or place of work with little time commitment. This Aroldo can also help work on behavior change and improve sleep. Check out the segment under Calm Masterclass and listen to The 4 Pillars of Health. A great way to begin learning about the foundation of lifestyle with practical tips to use in your every day.     Check out www.yourweightmatters.org blog for continued daily support and education along this weight loss journey!    Patient Resources:     Personal Training/Fitness Classes/Health Coaching     [facility] @ [email or web address] Full fitness center with group fitness and personal training. Discount available as client of [employer] [facility].  Health Coaching and Personal Training with Shannon Alonso at our [place] [facility]- individual weekly coaching with option to add personal training and small group fitness classes targeted at weight loss- [phone] and/or email @ [email or web address]  360FIT [place] [email or web address] Group Fitness [phone] and/or email Julisa at [email or web address]  [facility]itness Centers with multiple locations: Expan (www.T[employer] Managed IT Solutions Ltd.), Eat The Frog Fitness (www.The X Train.[employer]), Fit Body Bootcamp (www.NewtopiabodybootSoundBetterp.[employer] Wellness), Blendagram Fitness (www.Leikr.[employer] Wellness), The Exercise  (www.exercisecoach.[employer])     Online Fitness  Fitness  on Olocode  Fit in 10 DVD series- www.tnrgh60ZJS.com  Sit and Be Fit - Chair exercise series  Www.sitandbefit.org  Hip Hop Fit with Hong Chin at www.hiphopfit.net     Apps for on the Go Fitness  UpEnergy 7 Minute Workout (orange box with white 7) - free on the go HIIT training aroldo  Peloton Aroldo @ www.onepeloton.com     Nutrition Trackers and Tools  LoseIT! And My Fitness Pal apps and on line for tracking nutrition  NOOM - virtual health coaching  FitFoundation (healthy meals on the go) in [place] @ www.kzziwwljyrmsv5pSherpaa  Dinorah LARRY @ wwwYodiobistromd.com and Onzjkn93 (keto and low carb plans recommended) @ www.abildj47.com, Metabolic Meals @ www.MyMetabolicMeals.com - individual prepared meals to go  Gobble, Blue Apron, Home , Every Plate, Sunbasket- on line meal delivery programs for preparation at home  Meal Village in [place] for homemade meals to go @ www.mealHubblrage.[employer]  Diet Doctor @ www.dietdoctor.[employer] - low carb swaps  Yummly - meal prep and planning aroldo (www.yummly.com)     Stress Management/Behavior/Mindful Eating  CALM meditation aroldo (www.calm.com)  Headspace  Am I Hungry? Mindful eating virtual  aroldo  Www.yourweightmatters.org - Obesity Action Coalition sponsored Blog posts daily  Motivation aroldo (black box with white \")- daily supportive messages sent to your phone     Books/Video Education/Podcasts  Mindless Eating by Brock Weaver  Why We Get Sick by Homero Vallejo (a book about insulin resistance)  Atomic Habits by Thor Dc (a book about taking small steps to promote greater behavior change)   Can't Hurt Me by Johny Toussaint (a book exploring the power of discipline in achieving your goals)  The End of Dieting: How to Live for Life by Dr. Wang Cotton M.D. or listen to The TRSB Groupe Podcast Episode 63: Understanding \"Nutritarian\" Eating w/Dr. Wang Cotton  Your Body in Balance: The New Science of Food, Hormones, and Health by Dr. Lokesh Velazquez  The Menopause Diet Plan by Martina Cartagena and Baylee Miller  The Complete Guide to fasting by Dr. Vela  Sugar, Salt & Fat by Ludmila  Yoni, Ph.D, R.D.  Weight Loss Surgery Will Not Treat Food Addiction by Carolina Norris Ph.D  The Game Changers- MaxTradeIn.comix Documentary on plant based nutrition  Fed Up - documentary about obesity (Free on Utube)  The Truth About Sugar - documentary on sugar (Free on Utube, https://youtu.be/5M5vxhwWI7m)  The Dr. Goldstein T5 Wellness Plan by Dr. Jarod Goldstein MD  Fitlosophy Fitspiration - journal to better health (found at [employer] in fitness aisle)  What Happened to You?- a look at the impact trauma has on behavior written by Jeremy Marinelli and Dr. Rahul Blue  Whole Again by Rolando Bailey - discovering your true self after trauma  Ishaan Hubbard talk on Automated Trading Desk, The Call to Courage  Podcasts: The Exam Room by the Physician's Committee, Nutrition Facts by Dr. Maxwell    We are here to support you with weight loss, but please remember that you still need your primary care provider for your routine health maintenance.

## 2024-10-02 NOTE — PROGRESS NOTES
HISTORY OF PRESENT ILLNESS  Chief Complaint   Patient presents with    Weight Check     -13     Wilson Covarrubias is a 48 year old female here for follow up with medical weight loss program for the treatment of overweight, obesity, or morbid obesity.     Down 13 lbs (f/u from 7/2024)   Compliant on ozempic 1mg weekly and metformin 500mg bid  Tolerating well, helping with decreasing appetite and no side effects     Is feeling full  Trying to reduce carbs (is now having time to think about healthy food choices)  Feels like blood pressure looking better with weight loss   Success: lost #10 lbs  Exercise/Activity: 6x/ week, via walking, jumping rope, weight lifting (at work- 6 days per week)   Nutrition: eating regular meals, +protein, minimal veggies. not tracking reports  Meals out per week on average: 1  Stress is manageable   Sleep: 6 hours/night, waking up feeling rested    Denies chest pain, shortness of breath, dizziness, blurred vision, headache, paresthesia, nausea/vomiting.     Breakfast Lunch Dinner Snacks Fluids   Reviewed              Wt Readings from Last 6 Encounters:   10/02/24 168 lb (76.2 kg)   07/22/24 181 lb (82.1 kg)   06/06/24 178 lb 12 oz (81.1 kg)   03/13/24 189 lb (85.7 kg)   02/08/24 195 lb (88.5 kg)   05/19/23 179 lb (81.2 kg)          REVIEW OF SYSTEMS  GENERAL: feels well otherwise, denied any fevers chills or night sweats   LUNGS: denies shortness of breath  CARDIOVASCULAR: denies chest pain  GI: denies abdominal pain  MUSCULOSKELETAL: denies back pain, joint pains   PSYCH: denies change in behavior or mood, denies feeling sad or depressed    EXAM  /78   Pulse 88   Resp 16   Ht 5' 2\" (1.575 m)   Wt 168 lb (76.2 kg)   LMP 09/27/2024 (Exact Date)   BMI 30.73 kg/m²       GENERAL: well developed, well nourished, in no apparent distress, A/O x3  SKIN: no rashes, no suspicious lesions  HEENT: atraumatic, normocephalic, OP-clear, PERRLA  NECK: supple, no adenopathy  LUNGS: CTA in all  fields, breathing non labored  CARDIO: RRR without murmur  GI: +BS, NT/ND, no masses or HSM  EXTREMITIES: no cyanosis, no clubbing, no edema    Lab Results   Component Value Date     (H) 06/05/2024    BUN 13 06/05/2024    BUNCREA 16.7 06/05/2024    CREATSERUM 0.78 06/05/2024    ANIONGAP 9 06/05/2024    GFRNAA 69 05/18/2022    GFRAA 79 05/18/2022    CA 9.9 06/05/2024    OSMOCALC 289 06/05/2024    ALKPHO 69 06/05/2024    AST 34 06/05/2024    ALT 64 (H) 06/05/2024    BILT 0.5 06/05/2024    TP 8.2 06/05/2024    ALB 4.7 06/05/2024    GLOBULIN 3.5 06/05/2024     06/05/2024    K 4.1 06/05/2024     06/05/2024    CO2 27.0 06/05/2024     Lab Results   Component Value Date     (H) 06/05/2024    A1C 6.3 (H) 06/05/2024     Lab Results   Component Value Date    CHOLEST 192 06/05/2024    TRIG 71 06/05/2024    HDL 58 06/05/2024     (H) 06/05/2024    VLDL 12 06/05/2024    NONHDLC 134 (H) 06/05/2024     No results found for: \"B12\", \"VITB12\"  No results found for: \"VITD\", \"QVITD\", \"CYEM15DX\"    Current Outpatient Medications on File Prior to Visit   Medication Sig Dispense Refill    semaglutide 4 MG/3ML Subcutaneous Solution Pen-injector Inject 1 mg into the skin once a week. 3 mL 2    LISINOPRIL-HYDROCHLOROTHIAZIDE 20-25 MG Oral Tab TAKE 1 TABLET BY MOUTH DAILY 90 tablet 1    metFORMIN  MG Oral Tablet 24 Hr Take 1 tablet (500 mg total) by mouth 2 (two) times daily with meals. 60 tablet 2    amLODIPine 5 MG Oral Tab Take 1 tablet (5 mg total) by mouth daily. 90 tablet 1    ibuprofen 800 MG Oral Tab as needed.      [DISCONTINUED] semaglutide (OZEMPIC, 0.25 OR 0.5 MG/DOSE,) 2 MG/3ML Subcutaneous Solution Pen-injector Inject 0.25 mg into the skin once a week. 3 mL 1     No current facility-administered medications on file prior to visit.       ASSESSMENT/PLAN    ICD-10-CM    1. Therapeutic drug monitoring  Z51.81       2. Obesity (BMI 30-39.9)  E66.9       3. Type 2 diabetes mellitus without  complication, without long-term current use of insulin (HCC)  E11.9       4. Suspected sleep apnea  R29.818       5. Hypertension, unspecified type  I10       6. Prediabetes  R73.03           PLAN   Initial Weight Data and Goal Weight Loss:  Initial consult: #189 lbs on 3/2024  Weight Calculations  Initial Weight: 189 lbs  Initial Weight Date: 03/13/24  Today's Weight: 168 lbs  5% Goal: 9.45  10% Goal: 18.9  Total Weight Loss: 21 lbs  Total weight loss: Down 13 lbs total, Net loss 21 lbs  Continue with medications: ozempic 1mg weekly   Continue with medications: metformin 500mg bid   --advised of side effects and adverse effects of this medication  Contradictions: none  Reviewed labs  Continue with vitamin d OTC   HTN  stable, follows with PCP   Type 2 diabetes, reviewed last a1c 6.3% on 6/2024- will continue with metformin and ozempic   Snoring, is awaiting insurance coverage for sleep study  Wrote out macros and encouraged tracking   Nutrition: Low carb diet, recommended to eat breakfast daily/ regular protein intake  Follow up with dietitian and psychologist as recommended.  Discussed the role of sleep and stress in weight management.  Counseled on comprehensive weight loss plan including attention to nutrition, exercise and behavior/stress management for success. See patient instruction below for more details.  Discussed strategies to overcome barriers to successful weight loss and weight maintenance  FITTE: ACSM recommendations (150-300 minutes/ week in active weight loss)   Weight Loss Consent to treat reviewed and signed.    Total time spent on chart review, pre-charting, obtaining history, counseling, and educating, reviewing labs was 30 minutes.       NOTE TO PATIENT: The 21st Century Cures Act makes clinical notes like these available to patients in the interest of transparency. Clinical notes are medical documents used by physicians and care providers to communicate with each other. These documents  include medical language and terminology, abbreviations, and treatment information that may sound technical and at times possibly unfamiliar. In addition, at times, the verbiage may appear blunt or direct. These documents are one tool providers use to communicate relevant information and clinical opinions of the care providers in a way that allows common understanding of the clinical context.     There are no Patient Instructions on file for this visit.    No follow-ups on file.    Patient verbalizes understanding.    Meri Jaeger, APRN

## 2024-10-04 ENCOUNTER — HOSPITAL ENCOUNTER (OUTPATIENT)
Dept: MAMMOGRAPHY | Age: 49
Discharge: HOME | End: 2024-10-04
Attending: EMERGENCY MEDICINE
Payer: COMMERCIAL

## 2024-10-04 DIAGNOSIS — Z12.31 ENCOUNTER FOR SCREENING MAMMOGRAM FOR MALIGNANT NEOPLASM OF BREAST: ICD-10-CM

## 2024-10-04 PROCEDURE — 77063 BREAST TOMOSYNTHESIS BI: CPT | Performed by: EMERGENCY MEDICINE

## 2024-10-04 PROCEDURE — 77067 SCR MAMMO BI INCL CAD: CPT | Performed by: EMERGENCY MEDICINE

## 2024-10-15 ENCOUNTER — OFFICE VISIT (OUTPATIENT)
Dept: SLEEP CENTER | Age: 49
End: 2024-10-15
Attending: EMERGENCY MEDICINE
Payer: COMMERCIAL

## 2024-10-15 DIAGNOSIS — R29.818 SUSPECTED SLEEP APNEA: Primary | ICD-10-CM

## 2024-10-15 PROCEDURE — 95806 SLEEP STUDY UNATT&RESP EFFT: CPT

## 2024-10-25 ENCOUNTER — SLEEP STUDY (OUTPATIENT)
Facility: CLINIC | Age: 49
End: 2024-10-25
Payer: COMMERCIAL

## 2024-10-25 DIAGNOSIS — G47.9 SLEEP DISORDER: Primary | ICD-10-CM

## 2024-10-25 PROCEDURE — 95806 SLEEP STUDY UNATT&RESP EFFT: CPT | Performed by: OTHER

## 2024-10-27 DIAGNOSIS — I10 HYPERTENSION, UNSPECIFIED TYPE: ICD-10-CM

## 2024-10-28 DIAGNOSIS — I10 HYPERTENSION, UNSPECIFIED TYPE: ICD-10-CM

## 2024-10-28 RX ORDER — AMLODIPINE BESYLATE 5 MG/1
5 TABLET ORAL DAILY
Qty: 90 TABLET | Refills: 1 | OUTPATIENT
Start: 2024-10-28

## 2024-10-29 DIAGNOSIS — I10 HYPERTENSION, UNSPECIFIED TYPE: ICD-10-CM

## 2024-10-30 RX ORDER — AMLODIPINE BESYLATE 5 MG/1
5 TABLET ORAL DAILY
Qty: 90 TABLET | Refills: 1 | Status: SHIPPED | OUTPATIENT
Start: 2024-10-30

## 2024-12-02 DIAGNOSIS — E66.9 OBESITY (BMI 30-39.9): ICD-10-CM

## 2024-12-02 DIAGNOSIS — Z51.81 THERAPEUTIC DRUG MONITORING: ICD-10-CM

## 2024-12-02 DIAGNOSIS — E11.9 TYPE 2 DIABETES MELLITUS WITHOUT COMPLICATION, WITHOUT LONG-TERM CURRENT USE OF INSULIN (HCC): ICD-10-CM

## 2024-12-02 DIAGNOSIS — I10 HYPERTENSION, UNSPECIFIED TYPE: ICD-10-CM

## 2024-12-03 RX ORDER — AMLODIPINE BESYLATE 5 MG/1
5 TABLET ORAL DAILY
Qty: 30 TABLET | Refills: 0 | Status: SHIPPED | OUTPATIENT
Start: 2024-12-03 | End: 2024-12-09 | Stop reason: ALTCHOICE

## 2024-12-03 NOTE — TELEPHONE ENCOUNTER
Requesting   Requested Prescriptions     Pending Prescriptions Disp Refills    semaglutide 4 MG/3ML Subcutaneous Solution Pen-injector 3 mL 2     Sig: Inject 1 mg into the skin once a week.       LOV: 10/02/2024  RTC: in about 3 months  Filled: 09/20/2024 #3ml with 2 refills    Future Appointments   Date Time Provider Department Center   12/9/2024  4:00 PM Fraaz Rueda MD [facility] [place] 1/22/2025 10:00 AM Meri Jaeger APRN [facility]   5/21/2025  2:40 PM Meri Jaeger APRN [facility]

## 2024-12-09 ENCOUNTER — OFFICE VISIT (OUTPATIENT)
Dept: FAMILY MEDICINE CLINIC | Facility: CLINIC | Age: 49
End: 2024-12-09
Payer: COMMERCIAL

## 2024-12-09 VITALS
HEIGHT: 62 IN | OXYGEN SATURATION: 99 % | SYSTOLIC BLOOD PRESSURE: 102 MMHG | WEIGHT: 162 LBS | BODY MASS INDEX: 29.81 KG/M2 | DIASTOLIC BLOOD PRESSURE: 70 MMHG | RESPIRATION RATE: 16 BRPM | HEART RATE: 75 BPM

## 2024-12-09 DIAGNOSIS — I10 HYPERTENSION, UNSPECIFIED TYPE: Primary | ICD-10-CM

## 2024-12-09 DIAGNOSIS — Z51.81 THERAPEUTIC DRUG MONITORING: ICD-10-CM

## 2024-12-09 DIAGNOSIS — E66.9 OBESITY (BMI 30-39.9): ICD-10-CM

## 2024-12-09 DIAGNOSIS — E11.9 TYPE 2 DIABETES MELLITUS WITHOUT COMPLICATION, WITHOUT LONG-TERM CURRENT USE OF INSULIN (HCC): ICD-10-CM

## 2024-12-09 DIAGNOSIS — R29.818 SUSPECTED SLEEP APNEA: ICD-10-CM

## 2024-12-09 LAB — HEMOGLOBIN A1C: 5.4 % (ref 4.3–5.6)

## 2024-12-09 PROCEDURE — 83036 HEMOGLOBIN GLYCOSYLATED A1C: CPT | Performed by: EMERGENCY MEDICINE

## 2024-12-09 PROCEDURE — 99214 OFFICE O/P EST MOD 30 MIN: CPT | Performed by: EMERGENCY MEDICINE

## 2024-12-09 RX ORDER — AMLODIPINE BESYLATE 5 MG/1
5 TABLET ORAL DAILY
Qty: 90 TABLET | Refills: 0 | Status: CANCELLED | OUTPATIENT
Start: 2024-12-09

## 2024-12-09 RX ORDER — LISINOPRIL AND HYDROCHLOROTHIAZIDE 20; 25 MG/1; MG/1
1 TABLET ORAL DAILY
Qty: 90 TABLET | Refills: 1 | Status: SHIPPED | OUTPATIENT
Start: 2024-12-09

## 2024-12-09 RX ORDER — METFORMIN HYDROCHLORIDE 500 MG/1
500 TABLET, EXTENDED RELEASE ORAL 2 TIMES DAILY WITH MEALS
Qty: 180 TABLET | Refills: 1 | Status: SHIPPED | OUTPATIENT
Start: 2024-12-09

## 2024-12-09 NOTE — PATIENT INSTRUCTIONS
Thank you for choosing [facility]  To Do:  FOR JENY BURCIAGA    Ok to stop Amlodipine  Monitor BP closely after discontinuing.  Follow up with Dr Adames for sleep apnea and titration study  Recommend dietician consult with weight loss clinic  Arrange for DM eye exam Dr. Guadalupe  Follow up for PAP and annual physical when ready          CARLOS GUADALUPE MD  [facility]  [address] [place] [id number]    [address]  [place] [address]    Phone: [phone]  Fax: [phone].

## 2024-12-09 NOTE — PROGRESS NOTES
Chief Complaint:   Chief Complaint   Patient presents with    Diabetes     F/u     HPI:   This is a 49 year old female       HYPERTENSION  On Amlodipine and lisinopril hydrochlorothiazide  Has noted lower BP's at home  No dizziness  Dx with EARL still has to establish CPAP      Patient currently on metformin and Ozempic for her diabetes.  Also currently on lisinopril hydrochlorothiazide.  Reports good compliance has lost weight since last office visit.  Has been mindful with her eating habits has noticed decreased appetite since starting Ozempic.  Where      Wt Readings from Last 6 Encounters:   12/09/24 162 lb (73.5 kg)   10/02/24 168 lb (76.2 kg)   07/22/24 181 lb (82.1 kg)   06/06/24 178 lb 12 oz (81.1 kg)   03/13/24 189 lb (85.7 kg)   02/08/24 195 lb (88.5 kg)         [facility]       Past Medical History:    Essential hypertension    High blood pressure    Obesity    Visual impairment    reading glasses     Past Surgical History:   Procedure Laterality Date    Carpal tunnel release Right 05/12/2023     Social History:  Social History     Socioeconomic History    Marital status:    Tobacco Use    Smoking status: Never     Passive exposure: Never    Smokeless tobacco: Never   Vaping Use    Vaping status: Never Used   Substance and Sexual Activity    Alcohol use: Not Currently    Drug use: Never   Other Topics Concern    Caffeine Concern No    Exercise No    Seat Belt No    Special Diet No    Stress Concern No    Weight Concern No     Family History:  Family History   Problem Relation Age of Onset    Hypertension Mother     Diabetes Father      Allergies:  Allergies[1]  Current Meds:  Medications Ordered Prior to Encounter[2]   Counseling given: Not Answered         PROBLEM LIST     Patient Active Problem List   Diagnosis    Hypertension    Bilateral carpal tunnel syndrome    Suspected sleep apnea    Snoring    Prediabetes    Obesity (BMI 30-39.9)    Therapeutic drug monitoring    Type 2 diabetes mellitus  without complication, without long-term current use of insulin ([facility])         REVIEW OF SYSTEMS:   Review of systems significant for intentional weight loss  The rest of the review of systems is negative except those stated as above    PHYSICAL EXAM:   /70   Pulse 75   Resp 16   Ht 5' 2\" (1.575 m)   Wt 162 lb (73.5 kg)   LMP 11/25/2024 (Exact Date)   SpO2 99%   BMI 29.63 kg/m²  Estimated body mass index is 29.63 kg/m² as calculated from the following:    Height as of this encounter: 5' 2\" (1.575 m).    Weight as of this encounter: 162 lb (73.5 kg).   Vital signs reviewed.Appears stated age, well groomed.  GENERAL: well developed, well nourished, well hydrated, no distress  SKIN: good skin turgor, no obvious rashes  HEENT: atraumatic, normocephalic, ears, nose and throat are clear  EYES: sclera non icteric bilateral  NECK: supple, no adenopathy, no thyromegaly  LUNGS: clear to auscultation, no RRW  CARDIO: RRR without murmur  EXTREMITIES: no cyanosis, clubbing or edema  GI:soft Nontender Normoactive BS.  No palpable masses no guarding rigidity no rebound tenderness    Recent Results (from the past 4 weeks)   POC Hemoglobin A1C    Collection Time: 12/09/24  4:47 PM   Result Value Ref Range    HEMOGLOBIN A1C 5.4 4.3 - 5.6 %    Cartridge Lot# [id number] Numeric    Cartridge Expiration Date 8/29/26 Date       ASSESSMENT AND PLAN:       1. Hypertension, unspecified type  - Microalb/Creat Ratio, Random Urine [E]; Future  - lisinopril-hydroCHLOROthiazide 20-25 MG Oral Tab; Take 1 tablet by mouth daily.  Dispense: 90 tablet; Refill: 1    2. Type 2 diabetes mellitus without complication, without long-term current use of insulin ([facility])  - Comp Metabolic Panel (14); Future  - Lipid Panel; Future  - POC Hemoglobin A1C  - Microalb/Creat Ratio, Random Urine [E]; Future  - metFORMIN  MG Oral Tablet 24 Hr; Take 1 tablet (500 mg total) by mouth 2 (two) times daily with meals.  Dispense: 180 tablet; Refill: 1    3.  Suspected sleep apnea  - Pulmonary Referral - [facility]    4. Therapeutic drug monitoring  - metFORMIN  MG Oral Tablet 24 Hr; Take 1 tablet (500 mg total) by mouth 2 (two) times daily with meals.  Dispense: 180 tablet; Refill: 1    5. Obesity (BMI 30-39.9)  - metFORMIN  MG Oral Tablet 24 Hr; Take 1 tablet (500 mg total) by mouth 2 (two) times daily with meals.  Dispense: 180 tablet; Refill: 1        PATIENT INSTRUCTIONS:    Ok to stop Amlodipine  Monitor BP closely after discontinuing.  Follow up with Dr Adames for sleep apnea and titration study  Recommend dietician consult with weight loss clinic  Arrange for DM eye exam Dr. Guadalupe  Follow up for PAP and annual physical when ready  FOLLOW UP: for PAP          Health Maintenance Due   Topic Date Due    Diabetes Care Foot Exam  Never done    Diabetes Care Dilated Eye Exam  Never done    Colorectal Cancer Screening  Never done    Diabetes Care: Microalb/Creat Ratio  Never done    Pneumococcal Vaccine: Birth to 64yrs (1 of 2 - PCV) Never done    Pap Smear  11/10/2023    Annual Depression Screening  Never done    COVID-19 Vaccine (6 - 2024-25 season) 09/01/2024    Influenza Vaccine (1) 10/01/2024    Diabetes Care A1C  12/05/2024              [1] No Known Allergies  [2]   Current Outpatient Medications on File Prior to Visit   Medication Sig Dispense Refill    semaglutide 4 MG/3ML Subcutaneous Solution Pen-injector Inject 1 mg into the skin once a week. 3 mL 2    ibuprofen 800 MG Oral Tab as needed.       No current facility-administered medications on file prior to visit.

## 2024-12-10 ENCOUNTER — MED REC SCAN ONLY (OUTPATIENT)
Dept: FAMILY MEDICINE CLINIC | Facility: CLINIC | Age: 49
End: 2024-12-10

## 2024-12-18 DIAGNOSIS — E66.9 OBESITY (BMI 30-39.9): ICD-10-CM

## 2024-12-18 DIAGNOSIS — Z51.81 THERAPEUTIC DRUG MONITORING: ICD-10-CM

## 2024-12-18 DIAGNOSIS — E11.9 TYPE 2 DIABETES MELLITUS WITHOUT COMPLICATION, WITHOUT LONG-TERM CURRENT USE OF INSULIN (HCC): ICD-10-CM

## 2024-12-18 RX ORDER — SEMAGLUTIDE 1.34 MG/ML
1 INJECTION, SOLUTION SUBCUTANEOUS WEEKLY
Qty: 3 ML | Refills: 2 | OUTPATIENT
Start: 2024-12-18

## 2025-01-21 ENCOUNTER — OFFICE VISIT (OUTPATIENT)
Dept: INTERNAL MEDICINE CLINIC | Facility: CLINIC | Age: 50
End: 2025-01-21
Payer: COMMERCIAL

## 2025-01-21 VITALS
OXYGEN SATURATION: 99 % | HEART RATE: 90 BPM | RESPIRATION RATE: 16 BRPM | DIASTOLIC BLOOD PRESSURE: 76 MMHG | SYSTOLIC BLOOD PRESSURE: 128 MMHG | HEIGHT: 62 IN | BODY MASS INDEX: 29.49 KG/M2 | WEIGHT: 160.25 LBS

## 2025-01-21 DIAGNOSIS — I10 HYPERTENSION, UNSPECIFIED TYPE: ICD-10-CM

## 2025-01-21 DIAGNOSIS — E66.9 OBESITY (BMI 30-39.9): ICD-10-CM

## 2025-01-21 DIAGNOSIS — Z51.81 THERAPEUTIC DRUG MONITORING: Primary | ICD-10-CM

## 2025-01-21 DIAGNOSIS — E11.9 TYPE 2 DIABETES MELLITUS WITHOUT COMPLICATION, WITHOUT LONG-TERM CURRENT USE OF INSULIN (HCC): ICD-10-CM

## 2025-01-21 DIAGNOSIS — R73.03 PREDIABETES: ICD-10-CM

## 2025-01-21 PROCEDURE — 99214 OFFICE O/P EST MOD 30 MIN: CPT | Performed by: NURSE PRACTITIONER

## 2025-01-21 NOTE — PROGRESS NOTES
HISTORY OF PRESENT ILLNESS  Chief Complaint   Patient presents with    Follow - Up     LOV: 10/2/2024  Last weight: 168 lb down 8 lbs since last visit   NOV: 5/21/2025   Patient states they are doing well on the Semaglutide 4 mg and denies any side effects at this time. Had some constipation in the beginning but that has cleared up now      Wilson Covarrubias is a 49 year old female here for follow up with medical weight loss program for the treatment of overweight, obesity, or morbid obesity.     Down 8 lbs (f/u from 10/2024)   Compliant on ozempic 1mg weekly and metformin 500mg bid   Tolerating well, helping with decreasing appetite and no side effects     Success: low blood pressure- Was able to get off 1 blood pressure medication (amlodipine) with PCP   Challenging: cardio exercise  Had sleep study done- diagnosis with mild EARL- has appt in June to see if need device   Got A1c repeated with PCP and it was 5.4% on 12/2024  Is doing well with protein   Smaller amounts for breakfast   More vegs for dinner   Exercise/Activity: 6x/ week, via walking, jumping rope, weight lifting (at work- 6 days per week)   Nutrition: eating regular meals, +protein, minimal veggies. not tracking reports  Meals out per week on average: 1  Stress is manageable   Sleep: 7 hours/night, waking up feeling rested    Denies chest pain, shortness of breath, dizziness, blurred vision, headache, paresthesia, nausea/vomiting.     Breakfast Lunch Dinner Snacks Fluids   Reviewed              Wt Readings from Last 6 Encounters:   01/21/25 160 lb 4 oz (72.7 kg)   12/09/24 162 lb (73.5 kg)   10/02/24 168 lb (76.2 kg)   07/22/24 181 lb (82.1 kg)   06/06/24 178 lb 12 oz (81.1 kg)   03/13/24 189 lb (85.7 kg)          REVIEW OF SYSTEMS  GENERAL: feels well otherwise, denied any fevers chills or night sweats   LUNGS: denies shortness of breath  CARDIOVASCULAR: denies chest pain  GI: denies abdominal pain  MUSCULOSKELETAL: denies back pain, joint pains    PSYCH: denies change in behavior or mood, denies feeling sad or depressed    EXAM  /76 (BP Location: Left arm, Patient Position: Sitting, Cuff Size: adult)   Pulse 90   Resp 16   Ht 5' 2\" (1.575 m)   Wt 160 lb 4 oz (72.7 kg)   LMP 11/25/2024 (Exact Date)   SpO2 99%   BMI 29.31 kg/m²       GENERAL: well developed, well nourished, in no apparent distress, A/O x3  SKIN: no rashes, no suspicious lesions  HEENT: atraumatic, normocephalic, OP-clear, PERRLA  NECK: supple, no adenopathy  LUNGS: CTA in all fields, breathing non labored  CARDIO: RRR without murmur  GI: +BS, NT/ND, no masses or HSM  EXTREMITIES: no cyanosis, no clubbing, no edema    Lab Results   Component Value Date     (H) 06/05/2024    BUN 13 06/05/2024    BUNCREA 16.7 06/05/2024    CREATSERUM 0.78 06/05/2024    ANIONGAP 9 06/05/2024    GFRNAA 69 05/18/2022    GFRAA 79 05/18/2022    CA 9.9 06/05/2024    OSMOCALC 289 06/05/2024    ALKPHO 69 06/05/2024    AST 34 06/05/2024    ALT 64 (H) 06/05/2024    BILT 0.5 06/05/2024    TP 8.2 06/05/2024    ALB 4.7 06/05/2024    GLOBULIN 3.5 06/05/2024     06/05/2024    K 4.1 06/05/2024     06/05/2024    CO2 27.0 06/05/2024     Lab Results   Component Value Date     (H) 06/05/2024    A1C 5.4 12/09/2024     Lab Results   Component Value Date    CHOLEST 192 06/05/2024    TRIG 71 06/05/2024    HDL 58 06/05/2024     (H) 06/05/2024    VLDL 12 06/05/2024    NONHDLC 134 (H) 06/05/2024     No results found for: \"B12\", \"VITB12\"  No results found for: \"VITD\", \"QVITD\", \"JSKP66BV\"    Medications Ordered Prior to Encounter[1]    ASSESSMENT/PLAN    ICD-10-CM    1. Therapeutic drug monitoring  Z51.81       2. Obesity (BMI 30-39.9)  E66.9       3. Hypertension, unspecified type  I10       4. Prediabetes  R73.03       5. Type 2 diabetes mellitus without complication, without long-term current use of insulin (HCC)  E11.9           PLAN   Initial Weight Data and Goal Weight Loss:  Initial  consult: #189 lbs on 3/2024  Weight Calculations  Initial Weight: 189 lbs  Initial Weight Date: 03/13/24  Today's Weight: 160 lbs  5% Goal: 9.45  10% Goal: 18.9  Total Weight Loss: 29 lbs  Total weight loss: Down 8 lbs total, Net loss 29 lbs  Continue with medications: ozempic 1mg weekly   Continue with medications: metformin 500mg bid   --advised of side effects and adverse effects of this medication  Contradictions: none  Reviewed labs  Continue with vitamin d OTC   HTN  stable, follows with PCP   Type 2 diabetes, reviewed last A1c 5.4% on 12/2024- was previously 6.3% on 6/2024- will continue with metformin and ozempic   Snoring, see HPI regarding EARL  Wrote out macros and encouraged tracking   Nutrition: Low carb diet, recommended to eat breakfast daily/ regular protein intake  Follow up with dietitian and psychologist as recommended.  Discussed the role of sleep and stress in weight management.  Counseled on comprehensive weight loss plan including attention to nutrition, exercise and behavior/stress management for success. See patient instruction below for more details.  Discussed strategies to overcome barriers to successful weight loss and weight maintenance  FITTE: ACSM recommendations (150-300 minutes/ week in active weight loss)   Weight Loss Consent to treat reviewed and signed.    Total time spent on chart review, pre-charting, obtaining history, counseling, and educating, reviewing labs was 30 minutes.       NOTE TO PATIENT: The 21st Century Cures Act makes clinical notes like these available to patients in the interest of transparency. Clinical notes are medical documents used by physicians and care providers to communicate with each other. These documents include medical language and terminology, abbreviations, and treatment information that may sound technical and at times possibly unfamiliar. In addition, at times, the verbiage may appear blunt or direct. These documents are one tool providers use  to communicate relevant information and clinical opinions of the care providers in a way that allows common understanding of the clinical context.     There are no Patient Instructions on file for this visit.    No follow-ups on file.    Patient verbalizes understanding.    ROSALIE Marquez             [1]   Current Outpatient Medications on File Prior to Visit   Medication Sig Dispense Refill    semaglutide 4 MG/3ML Subcutaneous Solution Pen-injector Inject 1 mg into the skin once a week. 3 mL 2    lisinopril-hydroCHLOROthiazide 20-25 MG Oral Tab Take 1 tablet by mouth daily. 90 tablet 1    metFORMIN  MG Oral Tablet 24 Hr Take 1 tablet (500 mg total) by mouth 2 (two) times daily with meals. 180 tablet 1    ibuprofen 800 MG Oral Tab as needed.       No current facility-administered medications on file prior to visit.

## 2025-01-21 NOTE — PATIENT INSTRUCTIONS
Next steps:  1.  Fill your prescribed medication and take as discussed and prescribed: ozempic 1mg weekly and metformin   2.  Schedule a personal nutrition consultation with one of our registered dieticians     Please try to work on the following dietary changes:  Daily protein recommendation to start:  grams  Daily carbohydrate: <105g  Daily calories: 1,300-1,400  1.  Drink water with meals and throughout the day, cut down on soda and/or juice if consumed. Consider flavored water options like Bubbly, Spindrift, Hint and Tiffany.  2.  Eat breakfast daily and focus on having protein with each meal, examples include: greek yogurt, cottage cheese, hard boiled egg, whole grain toast with peanut butter.   3.  Reduce refined carbohydrates and sugars which includes items such as sweets, as well as rice, pasta, and bread and make sure to choose whole grain options when having them with just 1 serving per meal about the size of your inner palm.  4.  Consume non starchy veggies daily working towards making them a good 50% of your daily food intake. Add them to lunch and dinner consistently.  5.  Start a daily probiotic: VSL#3 is recommended, (order on line at www.vsl3.com). Take 1 capsule daily with water for 30 days, then reduce to 1 every other day (this will reduce the cost). Capsules can be left out for 2 weeks, but then must be refrigerated.      Please download priti My Fitness Pal, LoseIt! Or Net Diary to monitor daily dietary intake and you will be able to see if you are eating the right amount of calories or too much or too little which would hinder weight loss. Additionally this will help to see your daily carbohydrate and protein intake. When you set the priti up choose 1-2 lbs/week as a goal.  Keeping a paper food journal is an option as well to remain accountable for your choices- this is the start to mindful eating! A low calorie diet has been consistently shown to support weight loss.     Continue or start  exercising to help establish a routine. If not already exercising begin with 1 day and progress as able with long-term goal of 30 minutes 5 days a week at a minimum.     Meditation daily can help manage and control stress. Chronic stress can make weight loss difficult.  Exercising is one way to help with stress, but meditation using the CALM Aroldo or another comparable alternative can be done in your home or place of work with little time commitment. This Aroldo can also help work on behavior change and improve sleep. Check out the segment under Calm Masterclass and listen to The 4 Pillars of Health. A great way to begin learning about the foundation of lifestyle with practical tips to use in your every day.     Check out www.yourweightmatters.org blog for continued daily support and education along this weight loss journey!    Patient Resources:     Personal Training/Fitness Classes/Health Coaching     [facility] @ [email or web address] Full fitness center with group fitness and personal training. Discount available as client of [facility].  Health Coaching and Personal Training with Shannon Alonso at our [facility]- individual weekly coaching with option to add personal training and small group fitness classes targeted at weight loss- [phone] and/or email @ [email or web address]  [employer] [place] [email or web address]. Group Fitness [phone] and/or email Julisa at [email or web address]  [facility] Fitness Centers with multiple locations: HemoSonics (www.Here On Biz), Eat The Frog Fitness (www.Plaxica.Careerise), Fit Body Bootcamp (www.Leaders2020bodybootFantastic.clp.Careerise), REPLICEL LIFE SCIENCES Fitness (www.Crosswise.Careerise), The Exercise  (www.exercisecoach.Careerise)     Online Fitness  Fitness  on Beryllium  Fit in 10 DVD series- www.kjcso15ZPP.com  Sit and Be Fit - Chair exercise series  Www.sitandbefit.org  Hip Hop Fit with Hong Chin at www.hiphopfit.net     Apps for on the Go Fitness  [employer] 7 Minute Workout (orange box with white 7) - free on the go HIIT training aroldo  Peloton Aroldo @ www.onepeloton.com     Nutrition Trackers and Tools  LoseIT! And My Fitness Pal apps and on line for tracking nutrition  NOOM - virtual health coaching  FitFoundation (healthy meals on the go) in [place] @ www.ohbrlhzxbuypn1fOtoharmonics Corporation  Dinorah LARRY @ wwwTaggstarbistromd.com and Zpdmjg43 (keto and low carb plans recommended) @ www.fdilhf85.com, Metabolic Meals @ www.MyMetabolicMeals.com - individual prepared meals to go  Gobble, Blue Apron, Home , Every Plate, Sunbasket- on line meal delivery programs for preparation at home  Meal Village in [place] for homemade meals to go @ www.mealCellcaage.Alter-G  Diet Doctor @ www.dietdoctor.Alter-G - low carb swaps  Yummly - meal prep and planning aroldo (www.yummly.com)     Stress Management/Behavior/Mindful Eating  CALM meditation aroldo (www.calm.com)  Headspace  Am I Hungry? Mindful eating virtual  aroldo  Www.yourweightmatters.org - Obesity Action Coalition sponsored Blog posts daily  Motivation aroldo (black box with white \")- daily supportive messages sent to your phone     Books/Video Education/Podcasts  Mindless Eating by Brock Weaver  Why We Get Sick by Homero Vallejo (a book about insulin resistance)  Atomic Habits by Thor Dc (a book about taking small steps to promote greater behavior change)   Can't Hurt Me by Johny Toussaint (a book exploring the power of discipline in achieving your goals)  The End of Dieting: How to Live for Life by Dr. Wang Cotton M.D. or listen to The [employer] Podcast Episode 63: Understanding \"Nutritarian\" Eating w/Dr. Wang Cotton  Your Body in Balance: The New Science of Food, Hormones, and Health by Dr. Lokesh Velazquez  The Menopause Diet Plan by Martina Cartagena and Baylee Miller  The Complete Guide to fasting by Dr. Vela  Sugar, Salt & Fat by Ludmila  Yoni, Ph.D, R.D.  Weight Loss Surgery Will Not Treat Food Addiction by Carolina Norris Ph.D  The Game Changers- Storifyix Documentary on plant based nutrition  Fed Up - documentary about obesity (Free on Utube)  The Truth About Sugar - documentary on sugar (Free on Utube, https://youtu.be/5M1nacgKZ7p)  The Dr. Goldstein T5 Wellness Plan by Dr. Jaord Goldstein MD  Fitlosophy Fitspiration - journal to better health (found at [employer] in fitness aisle)  What Happened to You?- a look at the impact trauma has on behavior written by Jeremy Marinelli and Dr. Rahul Blue  Whole Again by Rolando Bailey - discovering your true self after trauma  Ishaan Hubbard talk on Giftly, The Call to Courage  Podcasts: The Exam Room by the Physician's Committee, Nutrition Facts by Dr. Maxwell    We are here to support you with weight loss, but please remember that you still need your primary care provider for your routine health maintenance.

## 2025-03-11 DIAGNOSIS — E66.9 OBESITY (BMI 30-39.9): ICD-10-CM

## 2025-03-11 DIAGNOSIS — E11.9 TYPE 2 DIABETES MELLITUS WITHOUT COMPLICATION, WITHOUT LONG-TERM CURRENT USE OF INSULIN (HCC): ICD-10-CM

## 2025-03-11 DIAGNOSIS — Z51.81 THERAPEUTIC DRUG MONITORING: ICD-10-CM

## 2025-03-11 RX ORDER — SEMAGLUTIDE 1.34 MG/ML
1 INJECTION, SOLUTION SUBCUTANEOUS WEEKLY
Qty: 3 ML | Refills: 2 | Status: SHIPPED | OUTPATIENT
Start: 2025-03-11

## 2025-03-11 NOTE — TELEPHONE ENCOUNTER
Requesting ozempic 1 mg  LOV: 1/21/25  RTC: 5 months  Last Relevant Labs: 12/9/24  Filled: 12/3/24 #3 ml with 2 refills    Future Appointments   Date Time Provider Department Center   5/21/2025  2:40 PM Meri Jaeger APRN [facility]   6/12/2025  9:00 AM Robert Adames, DO [facility] [place]   8/27/2025  2:00 PM Meri Jaeger APRN [facility]   11/12/2025  2:00 PM Meri Jaeger APRN [facility]

## 2025-04-09 ENCOUNTER — TELEPHONE (OUTPATIENT)
Dept: INTERNAL MEDICINE CLINIC | Facility: CLINIC | Age: 50
End: 2025-04-09

## 2025-04-09 NOTE — TELEPHONE ENCOUNTER
Patient had appointments scheduled on 8/27/25 and 11/12/25 with ROSALIE Marquez. Provider will be unavailable both dos. Called patient on 2/24/25, 3/5/2025 and 4/9/2025 to reschedule and no answer each time. Per office policy after contacting patient 3 times appointment is to be cancelled.   Appointments on 8/27/25 and 11/12/25 have been canceled due to provider unavailability.

## 2025-05-02 NOTE — PROGRESS NOTES
[facility] PULMONARY  SLEEP PROGRESS NOTE        HPI:   This is a 49 year old female coming in for   Chief Complaint   Patient presents with    New Patient     Pt here for sleep consult.  S/S performed 10/15/2024. Pt states snoring. Sleep questionnaire: 1/24       HPI:   This is a 49 year old female who presents with the following symptoms, risk factors, behaviors or other items associated with sleep problems.    Sleep Apnea:   (Patient-Rptd) snoring; previous sleep study  Insomnia:  (Patient-Rptd) no symptoms of Insomnia  Restless Leg:  (Patient-Rptd) no symptoms or restless legs  Parasomnias:   (Patient-Rptd) no symptoms of parasomnias  Daytime Problems:  (Patient-Rptd) no symptoms of daytime problems    The patient's Spencer Sleepiness score is (Patient-Rptd) 1/24.  Snoring, witnessed apnea   Weight has decreased to 159  She is very active  She is on wt loss medication  Wants to reach 135  Has dentures      Workhours 430am-3pm    Goes to sleep 9pm-4  Her sleep is interrupted    FINDINGS:  The flow evaluation recording time began at 8:58 p.m. and ended at 4:03 a.m. for a duration of 7 hours and 5 minutes.  Light snoring was recorded.  There were a total of 71 hypopneas and 7 apneas.  Overall apnea-hypopnea index was 11.0.  SaO2 iron was 81%.  Total time spent with oxyhemoglobin saturations less than 88% was 3 minutes.  Average pulse was 56.      IMPRESSION:  Overall mild obstructive sleep apnea.  AHI is 11.  SaO2 iron is 81%.        Patient: Sleep review of systems today: see form.      Pt  PCP:  Faraz Rueda MD  No referring provider defined for this encounter.           No data to display                    Past Medical History[1]  Past Surgical History[2]  Social History:  Social History     Social History Narrative    Not on file     Short Social Hx on File[3]  Family History:  Family History[4]  Allergies:  Allergies[5]  Current Meds:  Current Medications[6]   Counseling given: Not Answered         Problem  List:  Problem List[7]    REVIEW OF SYSTEMS:   Review of Systems    EXAM:   /86   Pulse 84   Resp 16   Ht 5' 2\" (1.575 m)   Wt 159 lb (72.1 kg)   LMP 11/25/2024 (Exact Date)   SpO2 98%   BMI 29.08 kg/m²  Estimated body mass index is 29.08 kg/m² as calculated from the following:    Height as of this encounter: 5' 2\" (1.575 m).    Weight as of this encounter: 159 lb (72.1 kg).   Neck in inches:      Wt Readings from Last 6 Encounters:   05/05/25 159 lb (72.1 kg)   01/21/25 160 lb 4 oz (72.7 kg)   12/09/24 162 lb (73.5 kg)   10/02/24 168 lb (76.2 kg)   07/22/24 181 lb (82.1 kg)   06/06/24 178 lb 12 oz (81.1 kg)     BP Readings from Last 3 Encounters:   05/05/25 128/86   01/21/25 128/76   12/09/24 102/70     Pulse Readings from Last 3 Encounters:   05/05/25 84   01/21/25 90   12/09/24 75     SpO2 Readings from Last 3 Encounters:   05/05/25 98%   01/21/25 99%   12/09/24 99%      Patient weight not recorded    Vital signs reviewed.  Physical Exam  Malim 2  ASSESSMENT AND PLAN:   1. EARL (obstructive sleep apnea)  The pathophysiology and mechanisms of obstructive sleep apnea were explained.  Adverse health consequences seen in association with EARL include increased risk of cardiovascular events , cerebrovascular events, hypertension,  diabetes. Treatment options were discussed.  Positive airway pressure therapy is the gold standard.  Other options include mandibular advancement devices, evaluation of the upper airway by ear nose throat specialist, inspire therapy, and weight loss to be used in conjunction.       Cpap trial   Follolwup 31-90 days  Working on wt loss  Will re-assess once reaches goal wt.    2. Class 1 obesity due to excess calories without serious comorbidity with body mass index (BMI) of 30.0 to 30.9 in adult  BMI 29    3. Shift worker     There are no Patient Instructions on file for this visit.    Independent interpretation of Sleep Download as defined above.  Continue with Rx management of  Sleep apnea with PAP therapy.    COMPLIANCE is required by insurance for 4 hours a night most nights of the week.    Advised if still with sleep apnea and not using CPAP has a 7 fold increase in risk of heart attack, stroke, abnormal heart rhythm  and death,  increased risk of driving accidents.     Advised to refrain from driving when sleepy.      Recommend weight loss, and maintain and optimal BMI with Exercise 30 minutes most days to target heart rate .     Advised patient to change filters,masks,hoses  and tubes and equiptment on a  regular schedule.    Filters and seals shall be changed every 1 month,  Hoses every 3 months,   CPAP mask and humidifier chamber changed every 6 month  with the durable medical equipment provider.         Meds & Refills for this Visit:  Requested Prescriptions      No prescriptions requested or ordered in this encounter       Outcome: Parent verbalizes understanding. Parent is notified to call with any questions, complications, allergies, or worsening or changing symptoms.  Parent is to call with any side effects or complications from the treatments as a result of today.     \" This note was created utilizing Dragon speech recognition software.  Please excuse any grammatical errors. Call my office if you have any questions regarding this note. \"     Robert Adames, DO  5/5/2025  1:36 PM         [1]   Past Medical History:   Essential hypertension    High blood pressure    Obesity    Visual impairment    reading glasses   [2]   Past Surgical History:  Procedure Laterality Date    Carpal tunnel release Right 05/12/2023    Other surgical history     [3]   Social History  Socioeconomic History    Marital status:    Tobacco Use    Smoking status: Never     Passive exposure: Never    Smokeless tobacco: Never   Vaping Use    Vaping status: Never Used   Substance and Sexual Activity    Alcohol use: Not Currently    Drug use: Never   Other Topics Concern    Caffeine Concern No     Exercise No    Seat Belt No    Special Diet No    Stress Concern No    Weight Concern No   [4]   Family History  Problem Relation Age of Onset    Hypertension Mother     Diabetes Father    [5] No Known Allergies  [6]   Current Outpatient Medications   Medication Sig Dispense Refill    OZEMPIC, 1 MG/DOSE, 4 MG/3ML Subcutaneous Solution Pen-injector INJECT 1 MG UNDER THE SKIN ONCE A WEEK 3 mL 2    lisinopril-hydroCHLOROthiazide 20-25 MG Oral Tab Take 1 tablet by mouth daily. 90 tablet 1    metFORMIN  MG Oral Tablet 24 Hr Take 1 tablet (500 mg total) by mouth 2 (two) times daily with meals. 180 tablet 1    ibuprofen 800 MG Oral Tab as needed.     [7]   Patient Active Problem List  Diagnosis    Hypertension    Bilateral carpal tunnel syndrome    Suspected sleep apnea    Snoring    Prediabetes    Obesity (BMI 30-39.9)    Therapeutic drug monitoring    Type 2 diabetes mellitus without complication, without long-term current use of insulin ([facility])    EARL (obstructive sleep apnea)    Class 1 obesity due to excess calories without serious comorbidity with body mass index (BMI) of 30.0 to 30.9 in adult

## 2025-05-05 ENCOUNTER — OFFICE VISIT (OUTPATIENT)
Facility: CLINIC | Age: 50
End: 2025-05-05
Payer: COMMERCIAL

## 2025-05-05 VITALS
OXYGEN SATURATION: 98 % | HEART RATE: 84 BPM | BODY MASS INDEX: 29.26 KG/M2 | SYSTOLIC BLOOD PRESSURE: 128 MMHG | HEIGHT: 62 IN | WEIGHT: 159 LBS | DIASTOLIC BLOOD PRESSURE: 86 MMHG | RESPIRATION RATE: 16 BRPM

## 2025-05-05 DIAGNOSIS — E66.09 CLASS 1 OBESITY DUE TO EXCESS CALORIES WITHOUT SERIOUS COMORBIDITY WITH BODY MASS INDEX (BMI) OF 30.0 TO 30.9 IN ADULT: ICD-10-CM

## 2025-05-05 DIAGNOSIS — G47.33 OSA (OBSTRUCTIVE SLEEP APNEA): Primary | ICD-10-CM

## 2025-05-05 DIAGNOSIS — E66.811 CLASS 1 OBESITY DUE TO EXCESS CALORIES WITHOUT SERIOUS COMORBIDITY WITH BODY MASS INDEX (BMI) OF 30.0 TO 30.9 IN ADULT: ICD-10-CM

## 2025-05-05 PROCEDURE — 99204 OFFICE O/P NEW MOD 45 MIN: CPT | Performed by: OTHER

## 2025-05-05 NOTE — PATIENT INSTRUCTIONS
Please be advised:   Do not drive while sleepy   Take CPAP/BiPAP machine to any procedure that requires sedation     When should I clean my machine & supplies?   Clean mask cushions or nasal pillow, headgear, tubing, and humidifier chamber with mild soap (Inez) and water   If water chamber has hard water buildup (white crust), soak in warm water & vinegar mix 50/50.   Rinse and hang dry     DAILY   Wipe mask cushions or nasal pillow   Empty & rinse water chamber- refill with distilled water     WEEKLY   Clean mask cushions or nasal pillow, headgear, tubing, and humidifier chamber with mild soap (Inez) and water   If water chamber has hard water buildup (white crust), soak in warm water & vinegar mix 50/50.   Rinse and hang dry     When should supplies be replaced?   Contact your home care company for replacement supplies, or if your machine is malfunctioning   *Below is a general guideline of what we recommend. Replacement of supplies differs depending on your insurance company*   MONTHLY: Replace filter and mask cushion   6 MONTHS: Replace headgear and tubing     Travel Tips   Keep CPAP/BiPAP in original bag when traveling, and place luggage tag on bag   Most airlines consider CPAP/BiPAP to be a medical device, therefore it is a free carry-on item   If unable to get distilled water, bottled water is safe while traveling. DO NOT use tap water   When traveling outside the [place] only a power adapter is necessary (CPAP can operate without a converter), bring an extension cord   Consider purchasing or renting a travel CPAP (not covered by insurance)     Dry Mouth/Nose   Turn up the humidity on your machine (select \"Options\" from the home screen)   Place a cool mist humidifier at your bedside   Over-the-counter remedies: Biotene, XyliMelts, NasoGel     Air Leak   Try adjusting your mask/headgear while laying in sleeping position vs. sitting up   Wash and dry your face prior to putting your mask on   If applicable:  shave facial hair at night (or before wearing CPAP)   Purchase \"RemZzzs\" (through home care co., GiftCard.com.[employer], or remzzzs.[employer])   100% cotton knit barrier that goes between your mask cushion and your skin   Replace your mask cushion (at least once per month) and/or headgear (every 3-6 months)     Nasal Congestion   CPAP therapy can cause nasal passages to dry out, & mucous membranes try to protect the nasal passages by producing excess mucous, so congestion results.   Over-the counter remedies: Flonase, Nasacort, Sinus Rinses (Neti-Pot), DO NOT USE Afrin   Try a mask that goes over the nose and mouth     Skin Irritation   Clean supplies regularly (Citrus II Mask Wipes, Control III disinfectant solution)   Try over the counter creams such as hydrocortisone 1% (apply in the morning after showering)   Your headgear may be too tight, replace supplies so you don't need to adjust so tightly   Try RemZzzs (100% cotton knit barrier that goes between your mask cushion and your skin)     Gas/Bloating   Try a different sleeping position to keep air out of the stomach. Lay on the left side or rotate to the right side. Incline with pillows or lay flat.   Over-the-counter remedies: Simethicone

## 2025-05-21 ENCOUNTER — OFFICE VISIT (OUTPATIENT)
Dept: INTERNAL MEDICINE CLINIC | Facility: CLINIC | Age: 50
End: 2025-05-21
Payer: COMMERCIAL

## 2025-05-21 VITALS
BODY MASS INDEX: 28.71 KG/M2 | WEIGHT: 156 LBS | RESPIRATION RATE: 18 BRPM | HEIGHT: 62 IN | OXYGEN SATURATION: 98 % | SYSTOLIC BLOOD PRESSURE: 106 MMHG | HEART RATE: 68 BPM | DIASTOLIC BLOOD PRESSURE: 76 MMHG

## 2025-05-21 DIAGNOSIS — I10 HYPERTENSION, UNSPECIFIED TYPE: ICD-10-CM

## 2025-05-21 DIAGNOSIS — E11.9 TYPE 2 DIABETES MELLITUS WITHOUT COMPLICATION, WITHOUT LONG-TERM CURRENT USE OF INSULIN (HCC): ICD-10-CM

## 2025-05-21 DIAGNOSIS — R73.03 PREDIABETES: ICD-10-CM

## 2025-05-21 DIAGNOSIS — E66.9 OBESITY (BMI 30-39.9): ICD-10-CM

## 2025-05-21 DIAGNOSIS — Z51.81 THERAPEUTIC DRUG MONITORING: Primary | ICD-10-CM

## 2025-05-21 PROCEDURE — 99214 OFFICE O/P EST MOD 30 MIN: CPT | Performed by: NURSE PRACTITIONER

## 2025-05-21 RX ORDER — SEMAGLUTIDE 1.34 MG/ML
1 INJECTION, SOLUTION SUBCUTANEOUS WEEKLY
Qty: 3 ML | Refills: 2 | Status: CANCELLED | OUTPATIENT
Start: 2025-05-21

## 2025-05-21 RX ORDER — SEMAGLUTIDE 1.34 MG/ML
1 INJECTION, SOLUTION SUBCUTANEOUS WEEKLY
Qty: 3 ML | Refills: 3 | Status: SHIPPED | OUTPATIENT
Start: 2025-05-21

## 2025-05-21 NOTE — PATIENT INSTRUCTIONS
Next steps:  1.  Fill your prescribed medication and take as discussed and prescribed: ozempic 1mg weekly  2.  Schedule a personal nutrition consultation with one of our registered dieticians     Please try to work on the following dietary changes:  Daily protein recommendation to start:  grams  Daily carbohydrate: <105g  Daily calories: 1,200-1,300  1.  Drink water with meals and throughout the day, cut down on soda and/or juice if consumed. Consider flavored water options like Bubbly, Spindrift, Hint and Tiffany.  2.  Eat breakfast daily and focus on having protein with each meal, examples include: greek yogurt, cottage cheese, hard boiled egg, whole grain toast with peanut butter.   3.  Reduce refined carbohydrates and sugars which includes items such as sweets, as well as rice, pasta, and bread and make sure to choose whole grain options when having them with just 1 serving per meal about the size of your inner palm.  4.  Consume non starchy veggies daily working towards making them a good 50% of your daily food intake. Add them to lunch and dinner consistently.  5.  Start a daily probiotic: VSL#3 is recommended, (order on line at www.vsl3.com). Take 1 capsule daily with water for 30 days, then reduce to 1 every other day (this will reduce the cost). Capsules can be left out for 2 weeks, but then must be refrigerated.      Please download priti My Fitness Pal, LoseIt! Or Net Diary to monitor daily dietary intake and you will be able to see if you are eating the right amount of calories or too much or too little which would hinder weight loss. Additionally this will help to see your daily carbohydrate and protein intake. When you set the priti up choose 1-2 lbs/week as a goal.  Keeping a paper food journal is an option as well to remain accountable for your choices- this is the start to mindful eating! A low calorie diet has been consistently shown to support weight loss.     Continue or start exercising to  help establish a routine. If not already exercising begin with 1 day and progress as able with long-term goal of 30 minutes 5 days a week at a minimum.     Meditation daily can help manage and control stress. Chronic stress can make weight loss difficult.  Exercising is one way to help with stress, but meditation using the CALM Aroldo or another comparable alternative can be done in your home or place of work with little time commitment. This Aroldo can also help work on behavior change and improve sleep. Check out the segment under Calm Masterclass and listen to The 4 Pillars of Health. A great way to begin learning about the foundation of lifestyle with practical tips to use in your every day.     Check out www.yourweightmatters.org blog for continued daily support and education along this weight loss journey!    Patient Resources:     Personal Training/Fitness Classes/Health Coaching     [facility] @ [email or web address] Full fitness center with group fitness and personal training. Discount available as client of [facility].  Health Coaching and Personal Training with Shannon Alonso at our [place] [facility]- individual weekly coaching with option to add personal training and small group fitness classes targeted at weight loss- [phone] and/or email @ [email or web address]  360FIT Fiatt [email or web address] Group Fitness [phone] and/or email Julisa at [email or web address]  FrancHospitals in [place]ed Fitness Centers with multiple locations: "[employer]" (www.Trinity [employer] Group), Eat The Gidsy Fitness (www.REBIScan.GLG), Fit Body Bootcamp (www.Radio One LlamabodyboPatron Technologyp.GLG), [employer] (www.Shaser.GLG), The Exercise  (www.exercisecoach.GLG)     Online Fitness  Fitness  on Utube  Fit in 10 DVD series- www.qwlef21CCT.com  Sit and Be Fit - Chair exercise series Www.sitandbefit.org  Hip  Hop Fit with Hong Chin at www.hiphopfit.net     Apps for on the Go Fitness  [place] 7 Minute Workout (orange box with white 7) - free on the go HIIT training aroldo  Peloton Aroldo @ www.onepeloton.com     Nutrition Trackers and Tools  LoseIT! And My Fitness Pal apps and on line for tracking nutrition  NOOM - virtual health coaching  FitFoundation (healthy meals on the go) in [place] @ www.[email or web address].Near Infinity  Bistro MD @ Dimers Lab.bistromd.com and Entbvf35 (keto and low carb plans recommended) @ www.yjumob53.com, Metabolic Meals @ www.MyMetabolicMeals.com - individual prepared meals to go  Gobble, Blue Apron, Home , Every Plate, Sunbasket- on line meal delivery programs for preparation at home  Meal Village in [place] for homemade meals to go @ www.mealvillage.Near Infinity  Diet Doctor @ www.dietdoctor.Near Infinity - low carb swaps  [employer] - meal prep and planning aroldo (www.yummly.com)     Stress Management/Behavior/Mindful Eating  CALM meditation aroldo (www.calm.com)  Headspace  Am I Hungry? Mindful eating virtual  aroldo  Www.yourweightmatters.org - Obesity Action Coalition sponsored Blog posts daily  Motivation aroldo (black box with white \")- daily supportive messages sent to your phone     Books/Video Education/Podcasts  Mindless Eating by Brock Weaver  Why We Get Sick by Homero Vallejo (a book about insulin resistance)  Atomic Habits by Thor Dc (a book about taking small steps to promote greater behavior change)   Can't Hurt Me by Johny Toussaint (a book exploring the power of discipline in achieving your goals)  The End of Dieting: How to Live for Life by Dr. Wang Cotton M.D. or listen to [employer] Podcast Episode 63: Understanding \"Nutritarian\" Eating w/Dr. Wang Cotton  Your Body in Balance: The New Science of Food, Hormones, and Health by Dr. Lokesh Velazquez  The Menopause Diet Plan by Martina Cartagena and Baylee Miller  The Complete Guide to fasting by Dr. Vela  Sugar, Salt & Fat by Ludmila Vallejo, Ph.D, R.D.  Weight  Loss Surgery Will Not Treat Food Addiction by Carolina Norris Ph.D  The Game Changers- Squarespaceix Documentary on plant based nutrition  Fed Up - documentary about obesity (Free on Utube)  The Truth About Sugar - documentary on sugar (Free on Utube, https://youCharmcastle Entertainment Ltd.u.be/0K0eyquHF0s)  The Dr. Goldstein T5 Wellness Plan by Dr. Jarod Goldstein MD  Fitlosophy Fitspiration - journal to better health (found at [employer] in fitness aisle)  What Happened to You?- a look at the impact trauma has on behavior written by Jeremy Marinelli and Dr. Rahul Blue  Whole Again by Rolando Bailey - discovering your true self after trauma  Ishaan Hubbard talk on PicksPal, The Call to Courage  Podcasts: The Exam Room by the Physician's Committee, Nutrition Facts by Dr. Maxwell    We are here to support you with weight loss, but please remember that you still need your primary care provider for your routine health maintenance.

## 2025-05-21 NOTE — PROGRESS NOTES
HISTORY OF PRESENT ILLNESS  Chief Complaint   Patient presents with    Weight Check     Down 4 lb 1/21/25     Wilson Covarrubias is a 49 year old female here for follow up with medical weight loss program for the treatment of overweight, obesity, or morbid obesity.     Down 4 lbs lbs follow-up from 1/2025  Compliant on ozempic 1mg weekly   Tolerating well, helping with decreasing appetite and no side effects     Challenging: My weight loss journey has seen a few changes in the last three months.   Admits that has been more busy the past couple of months...   Was eating out more frequently   Exercise/Activity: 5-7x/ week, via walking (1 hour), jumping rope (3 time per day), kettle ball (4 times per week)   Nutrition: eating regular meals, +protein, minimal veggies. not tracking reports  Meals out per week on average: 2  Stress is manageable   Sleep: 6 hours/night, waking up feeling rested    Denies chest pain, shortness of breath, dizziness, blurred vision, headache, paresthesia, nausea/vomiting.     Breakfast Lunch Dinner Snacks Fluids   Reviewed              Wt Readings from Last 6 Encounters:   05/21/25 156 lb (70.8 kg)   05/05/25 159 lb (72.1 kg)   01/21/25 160 lb 4 oz (72.7 kg)   12/09/24 162 lb (73.5 kg)   10/02/24 168 lb (76.2 kg)   07/22/24 181 lb (82.1 kg)          REVIEW OF SYSTEMS  GENERAL: feels well otherwise, denied any fevers chills or night sweats   LUNGS: denies shortness of breath  CARDIOVASCULAR: denies chest pain  GI: denies abdominal pain  MUSCULOSKELETAL: denies back pain, joint pains   PSYCH: denies change in behavior or mood, denies feeling sad or depressed    EXAM  /76   Pulse 68   Resp 18   Ht 5' 2\" (1.575 m)   Wt 156 lb (70.8 kg)   LMP 11/25/2024 (Exact Date)   SpO2 98%   BMI 28.53 kg/m²       GENERAL: well developed, well nourished, in no apparent distress, A/O x3  SKIN: no rashes, no suspicious lesions  HEENT: atraumatic, normocephalic, OP-clear, PERRLA  NECK: supple, no  adenopathy  LUNGS: CTA in all fields, breathing non labored  CARDIO: RRR without murmur  GI: +BS, NT/ND, no masses or HSM  EXTREMITIES: no cyanosis, no clubbing, no edema    Lab Results   Component Value Date     (H) 06/05/2024    BUN 13 06/05/2024    BUNCREA 16.7 06/05/2024    CREATSERUM 0.78 06/05/2024    ANIONGAP 9 06/05/2024    GFRNAA 69 05/18/2022    GFRAA 79 05/18/2022    CA 9.9 06/05/2024    OSMOCALC 289 06/05/2024    ALKPHO 69 06/05/2024    AST 34 06/05/2024    ALT 64 (H) 06/05/2024    BILT 0.5 06/05/2024    TP 8.2 06/05/2024    ALB 4.7 06/05/2024    GLOBULIN 3.5 06/05/2024     06/05/2024    K 4.1 06/05/2024     06/05/2024    CO2 27.0 06/05/2024     Lab Results   Component Value Date     (H) 06/05/2024    A1C 5.4 12/09/2024     Lab Results   Component Value Date    CHOLEST 192 06/05/2024    TRIG 71 06/05/2024    HDL 58 06/05/2024     (H) 06/05/2024    VLDL 12 06/05/2024    NONHDLC 134 (H) 06/05/2024     No results found for: \"B12\", \"VITB12\"  No results found for: \"VITD\", \"QVITD\", \"DVFF71GP\"    Medications Ordered Prior to Encounter[1]    ASSESSMENT/PLAN    ICD-10-CM    1. Therapeutic drug monitoring  Z51.81       2. Obesity (BMI 30-39.9)  E66.9       3. Prediabetes  R73.03       4. Hypertension, unspecified type  I10       5. Type 2 diabetes mellitus without complication, without long-term current use of insulin ([facility])  E11.9           PLAN   Initial Weight Data and Goal Weight Loss:  Initial consult: #189 lbs on 3/2024  Weight Calculations  Initial Weight: 189 lbs  Initial Weight Date: 03/13/24  Today's Weight: 156 lbs  5% Goal: 9.45  10% Goal: 18.9  Total Weight Loss: 33 lbs  Total weight loss: Down 4 lbs total, Net loss 33 lbs  Continue with medications: ozempic 1mg weekly   Continue with medications: metformin 500mg bid   --advised of side effects and adverse effects of this medication  Contradictions: none  Reviewed labs  Continue with vitamin d OTC   HTN  stable, follows  with PCP   Type 2 diabetes, reviewed last A1c 5.4% on 12/2024- will continue with metformin and ozempic   Snoring, was dx with mild EARL, has appt in June to discuss plan  Wrote out macros and encouraged tracking food  Nutrition: Low carb diet, recommended to eat breakfast daily/ regular protein intake  Follow up with dietitian and psychologist as recommended.  Discussed the role of sleep and stress in weight management.  Counseled on comprehensive weight loss plan including attention to nutrition, exercise and behavior/stress management for success. See patient instruction below for more details.  Discussed strategies to overcome barriers to successful weight loss and weight maintenance  FITTE: ACSM recommendations (150-300 minutes/ week in active weight loss)   Weight Loss Consent to treat reviewed and signed.    Total time spent on chart review, pre-charting, obtaining history, counseling, and educating, reviewing labs was 30 minutes.       NOTE TO PATIENT: The 21st Century Cures Act makes clinical notes like these available to patients in the interest of transparency. Clinical notes are medical documents used by physicians and care providers to communicate with each other. These documents include medical language and terminology, abbreviations, and treatment information that may sound technical and at times possibly unfamiliar. In addition, at times, the verbiage may appear blunt or direct. These documents are one tool providers use to communicate relevant information and clinical opinions of the care providers in a way that allows common understanding of the clinical context.     There are no Patient Instructions on file for this visit.    No follow-ups on file.    Patient verbalizes understanding.    Meri Jaeger, ROSALIE             [1]   Current Outpatient Medications on File Prior to Visit   Medication Sig Dispense Refill    OZEMPIC, 1 MG/DOSE, 4 MG/3ML Subcutaneous Solution Pen-injector INJECT 1 MG UNDER  THE SKIN ONCE A WEEK 3 mL 2    lisinopril-hydroCHLOROthiazide 20-25 MG Oral Tab Take 1 tablet by mouth daily. 90 tablet 1    metFORMIN  MG Oral Tablet 24 Hr Take 1 tablet (500 mg total) by mouth 2 (two) times daily with meals. 180 tablet 1    ibuprofen 800 MG Oral Tab as needed.      amLODIPine 5 MG Oral Tab  (Patient not taking: Reported on 5/21/2025)       No current facility-administered medications on file prior to visit.

## 2025-07-20 ENCOUNTER — PATIENT MESSAGE (OUTPATIENT)
Dept: FAMILY MEDICINE CLINIC | Facility: CLINIC | Age: 50
End: 2025-07-20

## 2025-07-21 NOTE — TELEPHONE ENCOUNTER
Dexcom sample requisition form was received via fax, placed in Dr Rueda's desk pending approval / signature.

## 2025-07-25 ENCOUNTER — MED REC SCAN ONLY (OUTPATIENT)
Dept: FAMILY MEDICINE CLINIC | Facility: CLINIC | Age: 50
End: 2025-07-25

## 2025-07-28 ENCOUNTER — TELEPHONE (OUTPATIENT)
Dept: INTERNAL MEDICINE CLINIC | Facility: CLINIC | Age: 50
End: 2025-07-28

## 2025-08-09 ENCOUNTER — LAB ENCOUNTER (OUTPATIENT)
Dept: LAB | Age: 50
End: 2025-08-09
Attending: EMERGENCY MEDICINE

## 2025-08-09 ENCOUNTER — OFFICE VISIT (OUTPATIENT)
Dept: FAMILY MEDICINE CLINIC | Facility: CLINIC | Age: 50
End: 2025-08-09

## 2025-08-09 VITALS
OXYGEN SATURATION: 100 % | BODY MASS INDEX: 28.89 KG/M2 | RESPIRATION RATE: 21 BRPM | WEIGHT: 157 LBS | HEART RATE: 67 BPM | HEIGHT: 62 IN | DIASTOLIC BLOOD PRESSURE: 80 MMHG | SYSTOLIC BLOOD PRESSURE: 120 MMHG

## 2025-08-09 DIAGNOSIS — E11.9 TYPE 2 DIABETES MELLITUS WITHOUT COMPLICATION, WITHOUT LONG-TERM CURRENT USE OF INSULIN (HCC): ICD-10-CM

## 2025-08-09 DIAGNOSIS — Z12.31 ENCOUNTER FOR SCREENING MAMMOGRAM FOR BREAST CANCER: ICD-10-CM

## 2025-08-09 DIAGNOSIS — Z12.11 SCREENING FOR COLON CANCER: ICD-10-CM

## 2025-08-09 DIAGNOSIS — I15.2 HYPERTENSION ASSOCIATED WITH DIABETES (HCC): ICD-10-CM

## 2025-08-09 DIAGNOSIS — Z00.00 LABORATORY EXAMINATION ORDERED AS PART OF A ROUTINE GENERAL MEDICAL EXAMINATION: ICD-10-CM

## 2025-08-09 DIAGNOSIS — Z00.00 ENCOUNTER FOR ANNUAL PHYSICAL EXAMINATION EXCLUDING GYNECOLOGICAL EXAMINATION IN A PATIENT OLDER THAN 17 YEARS: Primary | ICD-10-CM

## 2025-08-09 DIAGNOSIS — E11.59 HYPERTENSION ASSOCIATED WITH DIABETES (HCC): ICD-10-CM

## 2025-08-09 LAB
ALBUMIN SERPL-MCNC: 4.5 G/DL (ref 3.2–4.8)
ALBUMIN/GLOB SERPL: 1.5 (ref 1–2)
ALP LIVER SERPL-CCNC: 49 U/L (ref 39–100)
ALT SERPL-CCNC: 13 U/L (ref 10–49)
ANION GAP SERPL CALC-SCNC: 10 MMOL/L (ref 0–18)
AST SERPL-CCNC: 21 U/L (ref ?–34)
BASOPHILS # BLD AUTO: 0.11 X10(3) UL (ref 0–0.2)
BASOPHILS NFR BLD AUTO: 1.2 %
BILIRUB SERPL-MCNC: 0.6 MG/DL (ref 0.3–1.2)
BUN BLD-MCNC: 13 MG/DL (ref 9–23)
CALCIUM BLD-MCNC: 9.8 MG/DL (ref 8.7–10.6)
CHLORIDE SERPL-SCNC: 103 MMOL/L (ref 98–112)
CHOLEST SERPL-MCNC: 148 MG/DL (ref ?–200)
CO2 SERPL-SCNC: 26 MMOL/L (ref 21–32)
CREAT BLD-MCNC: 0.85 MG/DL (ref 0.55–1.02)
EGFRCR SERPLBLD CKD-EPI 2021: 84 ML/MIN/1.73M2 (ref 60–?)
EOSINOPHIL # BLD AUTO: 0.21 X10(3) UL (ref 0–0.7)
EOSINOPHIL NFR BLD AUTO: 2.2 %
ERYTHROCYTE [DISTWIDTH] IN BLOOD BY AUTOMATED COUNT: 12.5 %
FASTING PATIENT LIPID ANSWER: YES
FASTING STATUS PATIENT QL REPORTED: YES
GLOBULIN PLAS-MCNC: 3 G/DL (ref 2–3.5)
GLUCOSE BLD-MCNC: 94 MG/DL (ref 70–99)
HCT VFR BLD AUTO: 40.6 % (ref 35–48)
HDLC SERPL-MCNC: 64 MG/DL (ref 40–59)
HEMOGLOBIN A1C: 5.4 % (ref 4.3–5.6)
HGB BLD-MCNC: 13.5 G/DL (ref 12–16)
IMM GRANULOCYTES # BLD AUTO: 0.03 X10(3) UL (ref 0–1)
IMM GRANULOCYTES NFR BLD: 0.3 %
LDLC SERPL CALC-MCNC: 74 MG/DL (ref ?–100)
LYMPHOCYTES # BLD AUTO: 3.1 X10(3) UL (ref 1–4)
LYMPHOCYTES NFR BLD AUTO: 32.8 %
MCH RBC QN AUTO: 28.6 PG (ref 26–34)
MCHC RBC AUTO-ENTMCNC: 33.3 G/DL (ref 31–37)
MCV RBC AUTO: 86 FL (ref 80–100)
MONOCYTES # BLD AUTO: 0.56 X10(3) UL (ref 0.1–1)
MONOCYTES NFR BLD AUTO: 5.9 %
NEUTROPHILS # BLD AUTO: 5.44 X10 (3) UL (ref 1.5–7.7)
NEUTROPHILS # BLD AUTO: 5.44 X10(3) UL (ref 1.5–7.7)
NEUTROPHILS NFR BLD AUTO: 57.6 %
NONHDLC SERPL-MCNC: 84 MG/DL (ref ?–130)
OSMOLALITY SERPL CALC.SUM OF ELEC: 288 MOSM/KG (ref 275–295)
PLATELET # BLD AUTO: 374 10(3)UL (ref 150–450)
POTASSIUM SERPL-SCNC: 4.6 MMOL/L (ref 3.5–5.1)
PROT SERPL-MCNC: 7.5 G/DL (ref 5.7–8.2)
RBC # BLD AUTO: 4.72 X10(6)UL (ref 3.8–5.3)
SODIUM SERPL-SCNC: 139 MMOL/L (ref 136–145)
TRIGL SERPL-MCNC: 46 MG/DL (ref 30–149)
TSI SER-ACNC: 2.1 UIU/ML (ref 0.55–4.78)
VLDLC SERPL CALC-MCNC: 7 MG/DL (ref 0–30)
WBC # BLD AUTO: 9.5 X10(3) UL (ref 4–11)

## 2025-08-09 PROCEDURE — 84443 ASSAY THYROID STIM HORMONE: CPT

## 2025-08-09 PROCEDURE — 85025 COMPLETE CBC W/AUTO DIFF WBC: CPT

## 2025-08-09 PROCEDURE — 80061 LIPID PANEL: CPT

## 2025-08-09 PROCEDURE — 80053 COMPREHEN METABOLIC PANEL: CPT

## 2025-08-09 PROCEDURE — 36415 COLL VENOUS BLD VENIPUNCTURE: CPT

## 2025-08-09 RX ORDER — ACYCLOVIR 400 MG/1
1 TABLET ORAL
Qty: 9 EACH | Refills: 3 | Status: SHIPPED | OUTPATIENT
Start: 2025-08-09

## 2025-08-10 PROBLEM — E11.59 HYPERTENSION ASSOCIATED WITH DIABETES (HCC): Status: ACTIVE | Noted: 2020-10-20

## 2025-08-10 PROBLEM — I15.2 HYPERTENSION ASSOCIATED WITH DIABETES (HCC): Status: ACTIVE | Noted: 2020-10-20

## 2025-08-21 DIAGNOSIS — I10 HYPERTENSION, UNSPECIFIED TYPE: ICD-10-CM

## 2025-08-21 DIAGNOSIS — Z51.81 THERAPEUTIC DRUG MONITORING: ICD-10-CM

## 2025-08-21 DIAGNOSIS — E11.9 TYPE 2 DIABETES MELLITUS WITHOUT COMPLICATION, WITHOUT LONG-TERM CURRENT USE OF INSULIN (HCC): ICD-10-CM

## 2025-08-21 DIAGNOSIS — E66.9 OBESITY (BMI 30-39.9): ICD-10-CM

## 2025-08-22 RX ORDER — METFORMIN HYDROCHLORIDE 500 MG/1
500 TABLET, EXTENDED RELEASE ORAL 2 TIMES DAILY WITH MEALS
Qty: 180 TABLET | Refills: 1 | Status: SHIPPED | OUTPATIENT
Start: 2025-08-22

## 2025-08-26 RX ORDER — LISINOPRIL AND HYDROCHLOROTHIAZIDE 20; 25 MG/1; MG/1
1 TABLET ORAL DAILY
Qty: 90 TABLET | Refills: 3 | Status: SHIPPED | OUTPATIENT
Start: 2025-08-26

## (undated) DIAGNOSIS — I10 HYPERTENSION, UNSPECIFIED TYPE: ICD-10-CM

## (undated) DEVICE — 3M™ STERI-STRIP™ REINFORCED ADHESIVE SKIN CLOSURES, R1547, 1/2 IN X 4 IN (12 MM X 100 MM), 6 STRIPS/ENVELOPE: Brand: 3M™ STERI-STRIP™

## (undated) DEVICE — SOLUTION ANTIFOG W/ SPONGE

## (undated) DEVICE — DISPOSABLE BIPOLAR FORCEPS 4" (10.2CM) JEWELERS, STRAIGHT 0.4MM TIP AND 12 FT. (3.6M) CABLE: Brand: KIRWAN

## (undated) DEVICE — MINI-BLADE®: Brand: BEAVER®

## (undated) DEVICE — ALCOHOL 70% 4 OZ

## (undated) DEVICE — SOL NACL IRRIG 0.9% 1000ML BTL

## (undated) DEVICE — STERILE POLYISOPRENE POWDER-FREE SURGICAL GLOVES: Brand: PROTEXIS

## (undated) DEVICE — LOW PROFILE (LP) BLADE ASSEMBLY 6PK: Brand: MICROAIRE®

## (undated) DEVICE — GAUZE TRAY STERILE 4X4 12PLY

## (undated) DEVICE — GOWN,SIRUS,FABRIC-REINFORCED,X-LARGE: Brand: MEDLINE

## (undated) DEVICE — DERMABOND CLOSURE 0.7ML TOPICL

## (undated) DEVICE — DISPOSABLE TOURNIQUET CUFF SINGLE BLADDER, DUAL PORT AND QUICK CONNECT CONNECTOR: Brand: COLOR CUFF

## (undated) DEVICE — 3M™ TEGADERM™ +PAD FILM DRESSING WITH NON-ADHERENT PAD, 3587, 3-1/2 IN X 4-1/8 IN (9 CM X 10.5 CM), 25/CAR, 4 CAR/CS: Brand: 3M™ TEGADERM™

## (undated) DEVICE — STERILE POLYISOPRENE POWDER-FREE SURGICAL GLOVES WITH EMOLLIENT COATING: Brand: PROTEXIS

## (undated) DEVICE — UPPER EXTREMITY CDS-LF: Brand: MEDLINE INDUSTRIES, INC.

## (undated) DEVICE — SUT MONOCRYL 5-0 P-3 Y493G

## (undated) NOTE — LETTER
Date: 6/6/2024    Patient Name: Wilson Covarrubias      To Whom it may concern:    The above patient was seen at the [facility] for treatment of a medical condition.    This patient has a diagnosis of carpal tunnel. Please allow job accommodations for lighter duty-no strong  or pinches and to keep wrists neutral as much as possible. May wear wrist splints as needed.    Do not hesitate to call with any questions or concerns.         Sincerely,        Faraz Rueda MD    [facility], [address],  [address] [address] [place] [address], [id number] [phone].

## (undated) NOTE — LETTER
To Whom it may concern:    The above patient was seen at the [facility] for treatment of a medical condition.    This patient has a diagnosis of carpal tunnel. Please allow job accommodations for lighter duty-no strong  or pinches and to keep wrists neutral as much as possible. May wear wrist splints as needed.    Do not hesitate to call with any questions or concerns.          Sincerely,    Farza Rueda MD    Sincerely,    Faraz Rueda MD  [facility], [address] [place] [id number] [phone]        Document generated by:  Faraz Rueda MD

## (undated) NOTE — LETTER
Date: 8/14/2023    Patient Name: Kelvin Child          To Whom it may concern: The above patient was seen at the [facility] for treatment of a medical condition. This patient is cleared to return to work with no restrictions on 8/15/23. Sincerely,    Germán Zuniga MD  Hand, Wrist, & Elbow Surgery  [facility]  [facility] [address] [facility], Bonnie Mejias [place] 93 org  [email or web address]  t: [id number]  f: [phone]

## (undated) NOTE — LETTER
Date: 10/8/2021    Patient Name: Eduar Lucas    1975          To Whom it may concern: The above patient was seen at the [facility] for treatment of a medical condition. This patient has a diagnosis of carpal tunnel.  Please